# Patient Record
Sex: FEMALE | Race: WHITE | Employment: FULL TIME | URBAN - METROPOLITAN AREA
[De-identification: names, ages, dates, MRNs, and addresses within clinical notes are randomized per-mention and may not be internally consistent; named-entity substitution may affect disease eponyms.]

---

## 2017-01-02 NOTE — PROGRESS NOTES
SUBJECTIVE:                                                    Vero Michaels is a 41 year old female who presents to clinic today for the following health issues:      HPI    Hyperlipidemia Follow-Up      Rate your low fat/cholesterol diet?: fair    Taking statin?  Yes, no muscle aches from statin    Other lipid medications/supplements?:  Fenofibrate        Problem list and histories reviewed & adjusted, as indicated.  Additional history: as documented    Medication Followup of Phentermine     Taking Medication as prescribed: yes    Side Effects:  None    Medication Helping Symptoms:  yes       Patient Active Problem List   Diagnosis     Gestational diabetes mellitus, class A2     Depression     Former smoker     Impaired fasting glucose     Hypertriglyceridemia     Non morbid obesity     Type 2 diabetes mellitus without complication, without long-term current use of insulin (H)     Past Surgical History   Procedure Laterality Date     Cholecystectomy       Gyn surgery       c-sec       Social History   Substance Use Topics     Smoking status: Former Smoker -- 1.00 packs/day     Types: Cigarettes     Quit date: 08/20/2011     Smokeless tobacco: Never Used     Alcohol Use: No     Family History   Problem Relation Age of Onset     DIABETES Brother      DIABETES Maternal Grandfather      DIABETES Maternal Uncle      DIABETES Paternal Grandfather      DIABETES Father      C.A.D. Father      C.A.D. Paternal Grandfather          Current Outpatient Prescriptions   Medication Sig Dispense Refill     phentermine 37.5 MG capsule Take 1 capsule (37.5 mg) by mouth every morning 30 capsule 0     Fenofibrate Micronized 134 MG CAPS Take 1 capsule (134 mg) by mouth daily 90 capsule 3     atorvastatin (LIPITOR) 40 MG tablet Take 1 tablet (40 mg) by mouth daily 90 tablet 3     multivitamin, therapeutic with minerals (MULTI-VITAMIN) TABS Take 1 tablet by mouth daily       cyanocolbalamin (VITAMIN  B-12) 1000 MCG tablet Take  by mouth  "daily.       No Known Allergies  Recent Labs   Lab Test  01/05/17   0812  12/01/16   0756  12/10/15   0741 11/21/11   A1C   --   6.1*   --    --    LDL  45   --   28  28   HDL  30*   --   25*  25   TRIG  103   --   263*  424   ALT  23   --   24  21   CR  0.83   --   0.80  0.78   GFRESTIMATED  75   --   78   --    GFRESTBLACK  >90   GFR Calc     --   >90   GFR Calc     --    POTASSIUM  4.1   --   4.1  4.8   TSH  1.48   --   1.12  1.24      BP Readings from Last 3 Encounters:   01/06/17 110/74   12/09/16 116/76   12/08/15 112/70    Wt Readings from Last 3 Encounters:   01/06/17 214 lb (97.07 kg)   12/09/16 221 lb 12.8 oz (100.608 kg)   12/08/15 239 lb (108.41 kg)                  Labs reviewed in EPIC  Problem list, Medication list, Allergies, and Medical/Social/Surgical histories reviewed in [facility] and updated as appropriate.    ROS:  Constitutional, HEENT, cardiovascular, pulmonary, gi and gu systems are negative, except as otherwise noted.    OBJECTIVE:                                                    /74 mmHg  Pulse 90  Temp(Src) 97.7  F (36.5  C) (Temporal)  Resp 14  Ht 5' 4.96\" (1.65 m)  Wt 214 lb (97.07 kg)  BMI 35.65 kg/m2  LMP 01/02/2017  Body mass index is 35.65 kg/(m^2).  Physical Exam   Constitutional: She appears well-developed and well-nourished.   HENT:   Head: Normocephalic and atraumatic.   Cardiovascular: Normal rate and regular rhythm.    Pulmonary/Chest: Effort normal and breath sounds normal.   Psychiatric: She has a normal mood and affect.         Diagnostic Test Results:  none      ASSESSMENT/PLAN:                                                      Problem List Items Addressed This Visit     Hypertriglyceridemia     Improved triglycerides but the repeat cholesterol.  Continue fenofibrate and atorvastatin 40 mg daily.         Non morbid obesity     Lost close to 7 pounds in the last 6-7 weeks.  Denies any side effects from phentermine.  We'll " continue for 1 more month at an increased dose of 37.5 mg daily  Recheck in one month         Relevant Medications    phentermine 37.5 MG capsule      Other Visit Diagnoses     Screening for diabetic peripheral neuropathy         Relevant Orders     FOOT EXAM  NO CHARGE [81350.114] (Completed)              Helen Ng MD  [facility]

## 2017-01-05 DIAGNOSIS — E11.9 TYPE 2 DIABETES MELLITUS WITHOUT COMPLICATION, WITHOUT LONG-TERM CURRENT USE OF INSULIN (H): ICD-10-CM

## 2017-01-05 DIAGNOSIS — E66.9 NON MORBID OBESITY, UNSPECIFIED OBESITY TYPE: ICD-10-CM

## 2017-01-05 LAB
ALBUMIN SERPL-MCNC: 4.3 G/DL (ref 3.4–5)
ALP SERPL-CCNC: 39 U/L (ref 40–150)
ALT SERPL W P-5'-P-CCNC: 23 U/L (ref 0–50)
ANION GAP SERPL CALCULATED.3IONS-SCNC: 5 MMOL/L (ref 3–14)
AST SERPL W P-5'-P-CCNC: 12 U/L (ref 0–45)
BILIRUB SERPL-MCNC: 0.4 MG/DL (ref 0.2–1.3)
BUN SERPL-MCNC: 15 MG/DL (ref 7–30)
CALCIUM SERPL-MCNC: 8.7 MG/DL (ref 8.5–10.1)
CHLORIDE SERPL-SCNC: 108 MMOL/L (ref 94–109)
CHOLEST SERPL-MCNC: 96 MG/DL
CO2 SERPL-SCNC: 29 MMOL/L (ref 20–32)
CREAT SERPL-MCNC: 0.83 MG/DL (ref 0.52–1.04)
CREAT UR-MCNC: 92 MG/DL
GFR SERPL CREATININE-BSD FRML MDRD: 75 ML/MIN/1.7M2
GLUCOSE SERPL-MCNC: 119 MG/DL (ref 70–99)
HDLC SERPL-MCNC: 30 MG/DL
LDLC SERPL CALC-MCNC: 45 MG/DL
MICROALBUMIN UR-MCNC: 6 MG/L
MICROALBUMIN/CREAT UR: 6.88 MG/G CR (ref 0–25)
NONHDLC SERPL-MCNC: 66 MG/DL
POTASSIUM SERPL-SCNC: 4.1 MMOL/L (ref 3.4–5.3)
PROT SERPL-MCNC: 7.1 G/DL (ref 6.8–8.8)
SODIUM SERPL-SCNC: 142 MMOL/L (ref 133–144)
TRIGL SERPL-MCNC: 103 MG/DL
TSH SERPL DL<=0.005 MIU/L-ACNC: 1.48 MU/L (ref 0.4–4)

## 2017-01-05 PROCEDURE — 80053 COMPREHEN METABOLIC PANEL: CPT | Mod: 90 | Performed by: FAMILY MEDICINE

## 2017-01-05 PROCEDURE — 99000 SPECIMEN HANDLING OFFICE-LAB: CPT | Performed by: FAMILY MEDICINE

## 2017-01-05 PROCEDURE — 36415 COLL VENOUS BLD VENIPUNCTURE: CPT | Performed by: FAMILY MEDICINE

## 2017-01-05 PROCEDURE — 80061 LIPID PANEL: CPT | Mod: 90 | Performed by: FAMILY MEDICINE

## 2017-01-05 PROCEDURE — 84443 ASSAY THYROID STIM HORMONE: CPT | Mod: 90 | Performed by: FAMILY MEDICINE

## 2017-01-05 PROCEDURE — 82043 UR ALBUMIN QUANTITATIVE: CPT | Mod: 90 | Performed by: FAMILY MEDICINE

## 2017-01-06 ENCOUNTER — TELEPHONE (OUTPATIENT)
Dept: FAMILY MEDICINE | Facility: OTHER | Age: 42
End: 2017-01-06

## 2017-01-06 ENCOUNTER — OFFICE VISIT (OUTPATIENT)
Dept: FAMILY MEDICINE | Facility: OTHER | Age: 42
End: 2017-01-06
Payer: COMMERCIAL

## 2017-01-06 VITALS
WEIGHT: 214 LBS | TEMPERATURE: 97.7 F | BODY MASS INDEX: 35.65 KG/M2 | RESPIRATION RATE: 14 BRPM | HEIGHT: 65 IN | SYSTOLIC BLOOD PRESSURE: 110 MMHG | DIASTOLIC BLOOD PRESSURE: 74 MMHG | HEART RATE: 90 BPM

## 2017-01-06 DIAGNOSIS — Z13.89 SCREENING FOR DIABETIC PERIPHERAL NEUROPATHY: ICD-10-CM

## 2017-01-06 DIAGNOSIS — E66.9 NON MORBID OBESITY, UNSPECIFIED OBESITY TYPE: Primary | ICD-10-CM

## 2017-01-06 DIAGNOSIS — E78.1 HYPERTRIGLYCERIDEMIA: ICD-10-CM

## 2017-01-06 PROCEDURE — 99213 OFFICE O/P EST LOW 20 MIN: CPT | Performed by: FAMILY MEDICINE

## 2017-01-06 PROCEDURE — 99207 C FOOT EXAM  NO CHARGE: CPT | Mod: 25 | Performed by: FAMILY MEDICINE

## 2017-01-06 RX ORDER — PHENTERMINE HYDROCHLORIDE 37.5 MG/1
37.5 CAPSULE ORAL EVERY MORNING
Qty: 30 CAPSULE | Refills: 0 | Status: SHIPPED | OUTPATIENT
Start: 2017-01-06 | End: 2017-02-02

## 2017-01-06 ASSESSMENT — PAIN SCALES - GENERAL: PAINLEVEL: NO PAIN (0)

## 2017-01-06 NOTE — TELEPHONE ENCOUNTER
----- Message from Helen Ng MD sent at 1/5/2017  7:39 PM CST -----  Urine test is normal. Thyroid test is normal. Cholesterol levelshave significantly improved compared to last time.Blood chemistries including kidneyAnd liver functions are normal

## 2017-01-06 NOTE — MR AVS SNAPSHOT
"              After Visit Summary   1/6/2017    Vero Michaels    MRN: [id number]           Patient Information     Date Of Birth          1975        Visit Information        Provider Department      1/6/2017 9:40 AM Helen Ng MD [facility]        Today's Diagnoses     Screening for malignant neoplasm of cervix    -  1     Screening for diabetic peripheral neuropathy         Need for prophylactic vaccination against Streptococcus pneumoniae (pneumococcus)         Need for prophylactic vaccination with tetanus-diphtheria (TD)         Type 2 diabetes mellitus without complication, without long-term current use of insulin (H)            Follow-ups after your visit        Follow-up notes from your care team     Return in about 1 month (around 2/6/2017).      Who to contact     If you have questions or need follow up information about today's clinic visit or your schedule please contact [facility] directly at [phone].  Normal or non-critical lab and imaging results will be communicated to you by MyChart, letter or phone within 4 business days after the clinic has received the results. If you do not hear from us within 7 days, please contact the clinic through MyChart or phone. If you have a critical or abnormal lab result, we will notify you by phone as soon as possible.  Submit refill requests through [employer] or call your pharmacy and they will forward the refill request to us. Please allow 3 business days for your refill to be completed.          Additional Information About Your Visit        MyChart Information     [employer]My[employer] lets you send messages to your doctor, view your test results, renew your prescriptions, schedule appointments and more. To sign up, go to [email or web address] . Click on \"Log in\" on the left side of the screen, which will take you to the Welcome page. Then click on \"Sign up Now\" on the right side of the page.     You will be asked to enter the " "access code listed below, as well as some personal information. Please follow the directions to create your username and password.     Your access code is: [email or web address]  Expires: 3/9/2017 10:42 AM     Your access code will  in 90 days. If you need help or a new code, please call your [place] clinic or [phone].        Care EveryWhere ID     This is your Care EveryWhere ID. This could be used by other organizations to access your [place] medical records  [id number]        Your Vitals Were     Pulse Temperature Respirations Height BMI (Body Mass Index) Last Period     97.7  F (36.5  C) (Temporal) 14 5' 4.96\" (1.65 m) 35.65 kg/m2 2017       Blood Pressure from Last 3 Encounters:   17 110/74   16 116/76   12/08/15 112/70    Weight from Last 3 Encounters:   17 214 lb (97.07 kg)   16 221 lb 12.8 oz (100.608 kg)   12/08/15 239 lb (108.41 kg)              We Performed the Following     FOOT EXAM  NO CHARGE [08891.114]          Today's Medication Changes          These changes are accurate as of: 17 10:09 AM.  If you have any questions, ask your nurse or doctor.               These medicines have changed or have updated prescriptions.        Dose/Directions    phentermine 37.5 MG capsule   This may have changed:    - medication strength  - how much to take  - how to take this  - when to take this  - additional instructions   Used for:  Type 2 diabetes mellitus without complication, without long-term current use of insulin (H)   Changed by:  Helen Ng MD        Dose:  37.5 mg   Take 1 capsule (37.5 mg) by mouth every morning   Quantity:  30 capsule   Refills:  0            Where to get your medicines      Some of these will need a paper prescription and others can be bought over the counter.  Ask your nurse if you have questions.     Bring a paper prescription for each of these medications    - phentermine 37.5 MG capsule             Primary Care Provider Office Phone # " Fax #    Helen Ng [phone]       [facility] [address]    [facility] [address]        Thank you!     Thank you for choosing [facility]  for your care. Our goal is always to provide you with excellent care. Hearing back from our patients is one way we can continue to improve our services. Please take a few minutes to complete the written survey that you may receive in the mail after your visit with us. Thank you!             Your Updated Medication List - Protect others around you: Learn how to safely use, store and throw away your medicines at www.disposemymeds.org.          This list is accurate as of: 1/6/17 10:09 AM.  Always use your most recent med list.                   Brand Name Dispense Instructions for use    atorvastatin 40 MG tablet    LIPITOR    90 tablet    Take 1 tablet (40 mg) by mouth daily       cyanocobalamin 1000 MCG tablet    vitamin  B-12     Take  by mouth daily.       Fenofibrate Micronized 134 MG Caps     90 capsule    Take 1 capsule (134 mg) by mouth daily       Multi-vitamin Tabs tablet      Take 1 tablet by mouth daily       phentermine 37.5 MG capsule     30 capsule    Take 1 capsule (37.5 mg) by mouth every morning

## 2017-01-06 NOTE — ASSESSMENT & PLAN NOTE
Improved triglycerides but the repeat cholesterol.  Continue fenofibrate and atorvastatin 40 mg daily.

## 2017-01-06 NOTE — ASSESSMENT & PLAN NOTE
Lost close to 7 pounds in the last 6-7 weeks.  Denies any side effects from phentermine.  We'll continue for 1 more month at an increased dose of 37.5 mg daily  Recheck in one month

## 2017-01-06 NOTE — TELEPHONE ENCOUNTER
LM for pt to return our please give message from below. Sada Alexandra CMA ([facility])
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
## 2017-02-02 ENCOUNTER — OFFICE VISIT (OUTPATIENT)
Dept: FAMILY MEDICINE | Facility: OTHER | Age: 42
End: 2017-02-02
Payer: COMMERCIAL

## 2017-02-02 VITALS
SYSTOLIC BLOOD PRESSURE: 126 MMHG | BODY MASS INDEX: 34.99 KG/M2 | WEIGHT: 210 LBS | HEART RATE: 70 BPM | RESPIRATION RATE: 14 BRPM | TEMPERATURE: 98.2 F | HEIGHT: 65 IN | DIASTOLIC BLOOD PRESSURE: 80 MMHG

## 2017-02-02 DIAGNOSIS — Z23 NEED FOR PNEUMOCOCCAL VACCINATION: ICD-10-CM

## 2017-02-02 DIAGNOSIS — Z00.00 ENCOUNTER FOR ROUTINE ADULT HEALTH EXAMINATION WITHOUT ABNORMAL FINDINGS: Primary | ICD-10-CM

## 2017-02-02 DIAGNOSIS — Z12.4 SCREENING FOR CERVICAL CANCER: ICD-10-CM

## 2017-02-02 DIAGNOSIS — E11.9 TYPE 2 DIABETES MELLITUS WITHOUT COMPLICATION, WITHOUT LONG-TERM CURRENT USE OF INSULIN (H): ICD-10-CM

## 2017-02-02 DIAGNOSIS — E66.9 NON MORBID OBESITY, UNSPECIFIED OBESITY TYPE: ICD-10-CM

## 2017-02-02 DIAGNOSIS — Z12.4 SCREENING FOR MALIGNANT NEOPLASM OF CERVIX: ICD-10-CM

## 2017-02-02 PROCEDURE — 90471 IMMUNIZATION ADMIN: CPT | Performed by: FAMILY MEDICINE

## 2017-02-02 PROCEDURE — 90732 PPSV23 VACC 2 YRS+ SUBQ/IM: CPT | Performed by: FAMILY MEDICINE

## 2017-02-02 PROCEDURE — 99396 PREV VISIT EST AGE 40-64: CPT | Mod: 25 | Performed by: FAMILY MEDICINE

## 2017-02-02 PROCEDURE — 87624 HPV HI-RISK TYP POOLED RSLT: CPT | Performed by: FAMILY MEDICINE

## 2017-02-02 PROCEDURE — G0145 SCR C/V CYTO,THINLAYER,RESCR: HCPCS | Performed by: FAMILY MEDICINE

## 2017-02-02 RX ORDER — PHENTERMINE HYDROCHLORIDE 37.5 MG/1
37.5 CAPSULE ORAL EVERY MORNING
Qty: 30 CAPSULE | Refills: 0 | Status: SHIPPED | OUTPATIENT
Start: 2017-03-06 | End: 2017-04-06

## 2017-02-02 RX ORDER — PHENTERMINE HYDROCHLORIDE 37.5 MG/1
37.5 CAPSULE ORAL EVERY MORNING
Qty: 30 CAPSULE | Refills: 0 | Status: SHIPPED | OUTPATIENT
Start: 2017-02-03 | End: 2017-02-02

## 2017-02-02 ASSESSMENT — PAIN SCALES - GENERAL: PAINLEVEL: NO PAIN (0)

## 2017-02-02 NOTE — Clinical Note
[address]  [facility] [address]  [phone]      February 9, 2017    Vero Page  [address] [facility] [address]    Dear Vero,  We are happy to inform you that your PAP smear result from 2/2/17 is normal.  We are now able to do a follow up test on PAP smears. The DNA test is for HPV (Human Papilloma Virus). Cervical cancer is closely linked with certain types of HPV. Your result showed no evidence of high risk HPV.  Therefore we recommend you return in 3 years for your next pap smear.  You will still need to return to the clinic every year for an annual exam and other preventive tests.  Please contact the clinic with any questions.  Sincerely,  Helen Ng MD/dimitri

## 2017-02-02 NOTE — NURSING NOTE
"Chief Complaint   Patient presents with     Physical       Initial /80 mmHg  Pulse 70  Temp(Src) 98.2  F (36.8  C) (Temporal)  Resp 14  Ht 5' 5.12\" (1.654 m)  Wt 210 lb (95.255 kg)  BMI 34.82 kg/m2  LMP 01/02/2017 Estimated body mass index is 34.82 kg/(m^2) as calculated from the following:    Height as of this encounter: 5' 5.12\" (1.654 m).    Weight as of this encounter: 210 lb (95.255 kg).  BP completed using cuff size: regular/long  Mariama Peng CMA    "

## 2017-02-02 NOTE — MR AVS SNAPSHOT
After Visit Summary   2/2/2017    Vero Michaels    MRN: [id number]           Patient Information     Date Of Birth          1975        Visit Information        Provider Department      2/2/2017 8:00 AM Helen Ng MD [facility]        Today's Diagnoses     Screening for malignant neoplasm of cervix    -  1     Non morbid obesity, unspecified obesity type         Type 2 diabetes mellitus without complication, without long-term current use of insulin (H)         Screening for cervical cancer           Care Instructions      Preventive Health Recommendations  Female Ages 40 to 49    Yearly exam:     See your health care provider every year in order to  1. Review health changes.   2. Discuss preventive care.    3. Review your medicines if your doctor prescribed any.      Get a Pap test every three years (unless you have an abnormal result and your provider advises testing more often).      If you get Pap tests with HPV test, you only need to test every 5 years, unless you have an abnormal result. You do not need a Pap test if your uterus was removed (hysterectomy) and you have not had cancer.      You should be tested each year for STDs (sexually transmitted diseases), if you're at risk.       Ask your doctor if you should have a mammogram.      Have a colonoscopy (test for colon cancer) if someone in your family has had colon cancer or polyps before age 50.       Have a cholesterol test every 5 years.       Have a diabetes test (fasting glucose) after age 45. If you are at risk for diabetes, you should have this test every 3 years.    Shots: Get a flu shot each year. Get a tetanus shot every 10 years.     Nutrition:     Eat at least 5 servings of fruits and vegetables each day.    Eat whole-grain bread, whole-wheat pasta and brown rice instead of white grains and rice.    Talk to your provider about Calcium and Vitamin D.     Lifestyle    Exercise at least 150 minutes a week (an  average of 30 minutes a day, 5 days a week). This will help you control your weight and prevent disease.    Limit alcohol to one drink per day.    No smoking.     Wear sunscreen to prevent skin cancer.    See your dentist every six months for an exam and cleaning.        Follow-ups after your visit        Additional Services     DIABETES EDUCATOR REFERRAL       Your provider has referred you to Diabetes Education: FM[facility]: Diabetes Education - All [facility] [phone]   [email or web address]    This is a New Diagnosis: Initial group DSMT - 10 hours.    Type of diabetes is Type 2 - Diet Control                                                          A1C is: A1C      6.1   12/1/2016  If an urgent visit is needed or A1C is above 12, Care Team to call the diabetes education team at [phone] or send a message to the diabetes education pool (P DIAB ED-PATIENT CARE).    Diabetes education focus: Comprehensive Knowledge Assessment and Instruction, Knowledge: Healthy Eating, Being Active, Monitoring Blood Sugar, Taking Medication, Problem Solving/Goal Setting, Reducing Risks (Preventing Acute and Chronic Diabetes Complications) and Healthy Coping and Blood glucose meter instruction       Education needs: None                                                                                                                                                      Please be aware that coverage of these services is subject to the terms and limitations of your health insurance plan.  Call member services at your health plan to determine Diabetes Self-Management Training benefits and ask which blood glucose monitor brands are covered by your plan.      Please bring the following to your appointment:    -   List of current medications   -   List of blood glucose monitor brands that are covered by your insurance plan  -   Blood glucose monitor and log book  -   Food records for the 3  "days prior to your visit                  Follow-up notes from your care team     Return in about 2 months (around 2017).      Who to contact     If you have questions or need follow up information about today's clinic visit or your schedule please contact [facility] [place] directly at [phone].  Normal or non-critical lab and imaging results will be communicated to you by MyChart, letter or phone within 4 business days after the clinic has received the results. If you do not hear from us within 7 days, please contact the clinic through MyChart or phone. If you have a critical or abnormal lab result, we will notify you by phone as soon as possible.  Submit refill requests through [employer] or call your pharmacy and they will forward the refill request to us. Please allow 3 business days for your refill to be completed.          Additional Information About Your Visit        MyChart Information     [employer] lets you send messages to your doctor, view your test results, renew your prescriptions, schedule appointments and more. To sign up, go to [email or web address] . Click on \"Log in\" on the left side of the screen, which will take you to the Welcome page. Then click on \"Sign up Now\" on the right side of the page.     You will be asked to enter the access code listed below, as well as some personal information. Please follow the directions to create your username and password.     Your access code is: [email or web address]  Expires: 3/9/2017 10:42 AM     Your access code will  in 90 days. If you need help or a new code, please call your [facility] or [phone].        Care EveryWhere ID     This is your Care EveryWhere ID. This could be used by other organizations to access your [place] medical records  [id number]        Your Vitals Were     Pulse Temperature Respirations Height BMI (Body Mass Index) Last Period    70 98.2  F (36.8  C) (Temporal) 14 5' 5.12\" (1.654 m) 34.82 kg/m2 2017    "    Blood Pressure from Last 3 Encounters:   02/02/17 126/80   01/06/17 110/74   12/09/16 116/76    Weight from Last 3 Encounters:   02/02/17 210 lb (95.255 kg)   01/06/17 214 lb (97.07 kg)   12/09/16 221 lb 12.8 oz (100.608 kg)              We Performed the Following     DIABETES EDUCATOR REFERRAL     HPV High Risk Types DNA Cervical     Pap imaged thin layer screen with HPV - recommended age 30 - 65 years (select HPV order below)          Today's Medication Changes          These changes are accurate as of: 2/2/17  8:47 AM.  If you have any questions, ask your nurse or doctor.               Start taking these medicines.        Dose/Directions    phentermine 37.5 MG capsule   Used for:  Non morbid obesity, unspecified obesity type   Started by:  Helen Ng MD        Dose:  37.5 mg   Start taking on:  3/6/2017   Take 1 capsule (37.5 mg) by mouth every morning   Quantity:  30 capsule   Refills:  0            Where to get your medicines      Some of these will need a paper prescription and others can be bought over the counter.  Ask your nurse if you have questions.     Bring a paper prescription for each of these medications    - phentermine 37.5 MG capsule             Primary Care Provider Office Phone # Fax #    Helen Ng [phone]       [facility] [address]    [facility] [address]        Thank you!     Thank you for choosing [facility]  for your care. Our goal is always to provide you with excellent care. Hearing back from our patients is one way we can continue to improve our services. Please take a few minutes to complete the written survey that you may receive in the mail after your visit with us. Thank you!             Your Updated Medication List - Protect others around you: Learn how to safely use, store and throw away your medicines at www.disposemymeds.org.          This list is accurate as of: 2/2/17  8:47 AM.  Always use your most  recent med list.                   Brand Name Dispense Instructions for use    atorvastatin 40 MG tablet    LIPITOR    90 tablet    Take 1 tablet (40 mg) by mouth daily       cyanocobalamin 1000 MCG tablet    vitamin  B-12     Take  by mouth daily.       Fenofibrate Micronized 134 MG Caps     90 capsule    Take 1 capsule (134 mg) by mouth daily       Multi-vitamin Tabs tablet      Take 1 tablet by mouth daily       phentermine 37.5 MG capsule   Start taking on:  3/6/2017     30 capsule    Take 1 capsule (37.5 mg) by mouth every morning

## 2017-02-06 LAB
COPATH REPORT: NORMAL
PAP: NORMAL

## 2017-02-08 LAB
FINAL DIAGNOSIS: NORMAL
HPV HR 12 DNA CVX QL NAA+PROBE: NEGATIVE
HPV16 DNA SPEC QL NAA+PROBE: NEGATIVE
HPV18 DNA SPEC QL NAA+PROBE: NEGATIVE
SPECIMEN DESCRIPTION: NORMAL

## 2017-02-21 ENCOUNTER — TELEPHONE (OUTPATIENT)
Dept: FAMILY MEDICINE | Facility: OTHER | Age: 42
End: 2017-02-21

## 2017-02-22 ENCOUNTER — ALLIED HEALTH/NURSE VISIT (OUTPATIENT)
Dept: EDUCATION SERVICES | Facility: OTHER | Age: 42
End: 2017-02-22
Payer: COMMERCIAL

## 2017-02-22 VITALS — WEIGHT: 208.5 LBS | HEIGHT: 65 IN | BODY MASS INDEX: 34.74 KG/M2

## 2017-02-22 DIAGNOSIS — E11.9 TYPE 2 DIABETES MELLITUS WITHOUT COMPLICATION, WITHOUT LONG-TERM CURRENT USE OF INSULIN (H): Primary | ICD-10-CM

## 2017-02-22 PROCEDURE — G0108 DIAB MANAGE TRN  PER INDIV: HCPCS

## 2017-02-22 RX ORDER — LANCETS
EACH MISCELLANEOUS
Qty: 1 BOX | Status: SHIPPED | OUTPATIENT
Start: 2017-02-22 | End: 2017-09-22

## 2017-02-22 NOTE — MR AVS SNAPSHOT
After Visit Summary   2/22/2017    Vero Michaels    MRN: [id number]           Patient Information     Date Of Birth          1975        Visit Information        Provider Department      2/22/2017 10:30 AM ER DIABETIC ED RESOURCE [facility]        Today's Diagnoses     Type 2 diabetes mellitus without complication, without long-term current use of insulin (H)    -  1      Care Instructions    1.  Test blood sugar 2 times a day, before breakfast and 2 hours after 1 meal (vary the meal).  For 2-3 days, test blood sugar immediately after waking and again before breakfast to see how it responds to the long fast.    2.  Aim for a minimum of 30 grams of carb at meals and 15 grams of carb at snacks.    3.  Continue to remain active.    4.  Choose whole grains.    FOLLOW UP:  March 15th @ 11:30 am    [place] [facility] Education and Nutrition Services for the [facility]:  For Your Diabetes Education and Nutrition Appointments Call:  [phone]   For Diabetes Education or Nutrition Related Questions:   Phone: [phone]  E-mail: [email or web address]  Fax: [phone]   If you need a medication refill please contact your pharmacy. Please allow 3 business days for your refills to be completed.    Instructions for emailing the Diabetes Educators    If you need to communicate a non-urgent message to a Diabetes Educator via email, please send to [email or web address].    Please follow the following email guidelines:    Subject line: Secure: your clinic name (example: Secure: [facility])  In the email please include: First name, middle initial, last name and date of birth.    We will be in touch with you within one (1) business day.\        Follow-ups after your visit        Your next 10 appointments already scheduled     Mar 15, 2017 11:30 AM CDT   Diabetic Education with ER DIABETIC ED RESOURCE   [facility]    [address]  [place]  "[place] [id number] [phone]              Who to contact     If you have questions or need follow up information about today's clinic visit or your schedule please contact [facility] [place] directly at [phone].  Normal or non-critical lab and imaging results will be communicated to you by MyChart, letter or phone within 4 business days after the clinic has received the results. If you do not hear from us within 7 days, please contact the clinic through MyChart or phone. If you have a critical or abnormal lab result, we will notify you by phone as soon as possible.  Submit refill requests through [employer] or call your pharmacy and they will forward the refill request to us. Please allow 3 business days for your refill to be completed.          Additional Information About Your Visit        [employer] Information     [employer] lets you send messages to your doctor, view your test results, renew your prescriptions, schedule appointments and more. To sign up, go to [email or web address] . Click on \"Log in\" on the left side of the screen, which will take you to the Welcome page. Then click on \"Sign up Now\" on the right side of the page.     You will be asked to enter the access code listed below, as well as some personal information. Please follow the directions to create your username and password.     Your access code is: [email or web address]  Expires: 3/9/2017 10:42 AM     Your access code will  in 90 days. If you need help or a new code, please call your [facility] clinic or [phone].        Care EveryWhere ID     This is your Care EveryWhere ID. This could be used by other organizations to access your [place] medical records  [id number]        Your Vitals Were     Height BMI (Body Mass Index)                1.645 m (5' 4.75\") 34.96 kg/m2           Blood Pressure from Last 3 Encounters:   17 126/80   17 110/74   16 116/76    Weight from Last 3 Encounters:   17 94.6 kg (208 lb 8 " oz)   02/02/17 95.3 kg (210 lb)   01/06/17 97.1 kg (214 lb)              Today, you had the following     No orders found for display         Today's Medication Changes          These changes are accurate as of: 2/22/17 11:24 AM.  If you have any questions, ask your nurse or doctor.               Start taking these medicines.        Dose/Directions    blood glucose monitoring lancets   Used for:  Type 2 diabetes mellitus without complication, without long-term current use of insulin (H)        Use to test blood sugar 2 times daily or as directed.  Ok to substitute alternative if insurance prefers.   Quantity:  1 Box   Refills:  prn       blood glucose monitoring test strip   Commonly known as:  ACCU-CHEK SMARTVIEW   Used for:  Type 2 diabetes mellitus without complication, without long-term current use of insulin (H)        Use to test blood sugar 2 times daily or as directed.  Ok to substitute alternative if insurance prefers.   Quantity:  100 strip   Refills:  3            Where to get your medicines      These medications were sent to [facility] [address] [place] [address] [address] [address]     Phone:  [phone]     blood glucose monitoring lancets    blood glucose monitoring test strip                Primary Care Provider Office Phone # Fax #    Helen Ng [phone]       [address] [address]        Thank you!     Thank you for choosing [facility]  for your care. Our goal is always to provide you with excellent care. Hearing back from our patients is one way we can continue to improve our services. Please take a few minutes to complete the written survey that you may receive in the mail after your visit with us. Thank you!             Your Updated Medication List - Protect others around you: Learn how to safely use, store and throw away your medicines at www.disposemymeds.org.           This list is accurate as of: 2/22/17 11:24 AM.  Always use your most recent med list.                   Brand Name Dispense Instructions for use    atorvastatin 40 MG tablet    LIPITOR    90 tablet    Take 1 tablet (40 mg) by mouth daily       blood glucose monitoring lancets     1 Box    Use to test blood sugar 2 times daily or as directed.  Ok to substitute alternative if insurance prefers.       blood glucose monitoring test strip    ACCU-CHEK SMARTVIEW    100 strip    Use to test blood sugar 2 times daily or as directed.  Ok to substitute alternative if insurance prefers.       cyanocobalamin 1000 MCG tablet    vitamin  B-12     Take  by mouth daily.       Fenofibrate Micronized 134 MG Caps     90 capsule    Take 1 capsule (134 mg) by mouth daily       Multi-vitamin Tabs tablet      Take 1 tablet by mouth daily       phentermine 37.5 MG capsule   Start taking on:  3/6/2017     30 capsule    Take 1 capsule (37.5 mg) by mouth every morning

## 2017-02-22 NOTE — Clinical Note
[place],   I met with Vero Michaels for a diabetes education visit today. Ongoing plan for education and support: Follow-up visit with diabetes educator in 3 weeks. If there are any questions or recommended changes to the patient's diabetes care plan in the future, I will be in touch. Thank you for your referral!  Radha Peng RD, LD, CDE   (This communication is being sent to you in order to comply with our American Association of Diabetes Educators accreditation standards.)

## 2017-02-22 NOTE — PROGRESS NOTES
Diabetes Self Management Training: Initial Assessment Visit for Newly Diagnosed Patients (Complete AADE Goals Flowsheet)    Vero Michaels presents today for education related to Type 2 diabetes.    She is accompanied by self    Patient's diabetes management related comments/concerns: Hx of GDM and strong family history of Type 2 Diabetes.  Has been working really hard at weight loss.    Patient's emotional response to diabetes: expresses readiness to learn, anxiety and concern for health and well-being    Patient would like this visit to be focused around the following diabetes-related behaviors and goals: Healthy Eating and Monitoring    ASSESSMENT:  Patient Problem List and Family Medical History reviewed for relevant medical history, current medical status, and diabetes risk factors.    Current Diabetes Management per Patient:  Taking diabetes medications? no    Past Diabetes Education: Newly diagnosed    Patient glucose self monitoring as follows: BG meter taught today.     Patient's most recent   Lab Results   Component Value Date    A1C 6.1 12/01/2016    is meeting goal of <7.0    Nutrition:  Patient is on phentermine.  Finds she thought about food 24/7 prior to taking this medication.  Tries to keep busy.  Lives with mother who is disabled and has diabetes    Wakes @ 4:45 am.  Doesn't feel well so does not like to eat.  Breakfast - none  Lunch - 11:30:  Homemade taco salad OR ashley salad.  Diet Green tea  PM:  fruit   Dinner - (5 - 5:30 pm):  Vegetable / salad (cauliflower with parmesean) + burger.  Snacks - Bowl of special K cereal w/ milk (doesn't drink the milk) OR popcorn OR cheese.    Beverages: Tea  1/2 - 1/day and Water throught the day    Cultural/Roman [employer] diet restrictions: Avoids starches at dinner as it triggers over-eating.    Biggest Challenge to Healthy Eating: emotional eating    HBW: 240 lbs.  Wt is down 32 lbs over the past 6 months.    Physical Activity:    Does better with 'projects' versus  "exercising.  Cleaning, painting, etc.  Has rode the exercise bike, but dislikes it.  Fridays - cleans house thoroughly.  Active w/ 9 y/o son.    Diabetes Risk Factors:  family history, hypertriglyceridemia and overweight/obesity    Diabetes Complications:  Not discussed today.    Vitals:  Wt 94.6 kg (208 lb 8 oz)  BMI 34.57 kg/m2  Estimated body mass index is 34.57 kg/(m^2) as calculated from the following:    Height as of 2/2/17: 1.654 m (5' 5.12\").    Weight as of this encounter: 94.6 kg (208 lb 8 oz).   Last 3 BP:   BP Readings from Last 3 Encounters:   02/02/17 126/80   01/06/17 110/74   12/09/16 116/76       History   Smoking Status     Former Smoker     Packs/day: 1.00     Types: Cigarettes     Quit date: 8/20/2011   Smokeless Tobacco     Never Used       Labs:  Lab Results   Component Value Date    A1C 6.1 12/01/2016     Lab Results   Component Value Date     01/05/2017     Lab Results   Component Value Date    LDL 45 01/05/2017     HDL Cholesterol   Date Value Ref Range Status   01/05/2017 30 (L) >49 mg/dL Final   ]  GFR Estimate   Date Value Ref Range Status   01/05/2017 75 >60 mL/min/1.7m2 Final     Comment:     Non  GFR Calc     GFR Estimate If Black   Date Value Ref Range Status   01/05/2017 >90   GFR Calc   >60 mL/min/1.7m2 Final     Lab Results   Component Value Date    CR 0.83 01/05/2017     No results found for: MICROALBUMIN    Socio/Economic Considerations:    Support system: family    Health Beliefs and Attitudes:   Patient Activation Measure Survey Score:  No flowsheet data found.    Stage of Change: ACTION (Actively working towards change)      Diabetes knowledge and skills assessment:     Patient is knowledgeable in diabetes management concepts related to: Being Active    Patient needs further education on the following diabetes management concepts: Healthy Eating, Monitoring, Problem Solving, Reducing Risks and Healthy Coping    Barriers to Learning " Assessment: No Barriers identified    Based on learning assessment above, most appropriate setting for further diabetes education would be: Group class or Individual setting.    INTERVENTION:   Education provided today on:  AADE Self-Care Behaviors:  Healthy Eating: carbohydrate counting and consistency in amount, composition, and timing of food intake  Monitoring: purpose, proper technique, log and interpret results, individual blood glucose targets, frequency of monitoring, use of glucose control solution and proper sharps disposal  Patient was instructed on Accu-chek Dorinda meter.  She has used a blood glucose meter in the past when she had GDM and declined doing a self test today.  Opportunities for ongoing education and support in diabetes-self management were discussed.    Pt verbalized understanding of concepts discussed and recommendations provided today.       Education Materials Provided:  [place] Understanding Diabetes Booklet, BG Log Sheet and Accu-chek Dorinda meter kit    PLAN:  See Patient Instructions for co-developed, patient-stated behavior change goals.  Meal Plan Recommendation: If BG rises in the morning when you don't eat breakfast, begin to eat breakfast daily.  Aim for a minimum of 30 grams of carb at meals.  Choose whole grains.  Check blood sugars fasting and 2 hours after the start of meals ( vary the meal)  Keep a food record for the next visit.  AVS printed and provided to patient today.    FOLLOW-UP:  Follow-up appointment scheduled on 3/15/17.  Education topics to cover at the next diabetes education visit(s): reducing risks.  Assess meal plan.  Chart routed to referring provider.    Ongoing plan for education and support: Follow-up visit with diabetes educator in 3 weeks.    Radha Peng RD, LD, CDE    Time Spent: 60 minutes  Encounter Type: Individual

## 2017-02-22 NOTE — PATIENT INSTRUCTIONS
1.  Test blood sugar 2 times a day, before breakfast and 2 hours after 1 meal (vary the meal).  For 2-3 days, test blood sugar immediately after waking and again before breakfast to see how it responds to the long fast.    2.  Aim for a minimum of 30 grams of carb at meals and 15 grams of carb at snacks.    3.  Continue to remain active.    4.  Choose whole grains.    FOLLOW UP:  March 15th @ 11:30 am    [place] Diabetes Education and Nutrition Services for the [facility]:  For Your Diabetes Education and Nutrition Appointments Call:  [phone]   For Diabetes Education or Nutrition Related Questions:   Phone: [phone]  E-mail: [email or web address]  Fax: [phone]   If you need a medication refill please contact your pharmacy. Please allow 3 business days for your refills to be completed.    Instructions for emailing the Diabetes Educators    If you need to communicate a non-urgent message to a Diabetes Educator via email, please send to [email or web address].    Please follow the following email guidelines:    Subject line: Secure: your clinic name (example: Secure: [facility])  In the email please include: First name, middle initial, last name and date of birth.    We will be in touch with you within one (1) business day.\

## 2017-03-15 ENCOUNTER — ALLIED HEALTH/NURSE VISIT (OUTPATIENT)
Dept: EDUCATION SERVICES | Facility: OTHER | Age: 42
End: 2017-03-15
Payer: COMMERCIAL

## 2017-03-15 VITALS — HEIGHT: 65 IN | BODY MASS INDEX: 33.49 KG/M2 | WEIGHT: 201 LBS

## 2017-03-15 DIAGNOSIS — E11.9 TYPE 2 DIABETES MELLITUS WITHOUT COMPLICATION, WITHOUT LONG-TERM CURRENT USE OF INSULIN (H): Primary | ICD-10-CM

## 2017-03-15 PROCEDURE — G0108 DIAB MANAGE TRN  PER INDIV: HCPCS

## 2017-03-15 NOTE — PROGRESS NOTES
Diabetes Self Management Training: Follow-up Visit    Vero Michaels presents today for education and evaluation of glucose control related to Type 2 diabetes.    She is accompanied by self    Patient's diabetes management related comments/concerns: Doing well.   recently diagnosed with diabetes with an A1c of 13%.  Working on diet changes together.      Patient would like this visit to be focused around the following diabetes-related behaviors and goals: Assistance with making lifestyle changes    ASSESSMENT:  Patient Problem List reviewed for relevant medical history and current medical status.    Current Diabetes Management per Patient:  Taking diabetes medications? no    Patient glucose self monitoring as follows: two times daily.     BG results: fasting glucose- 120 (today), 106, 118, 122, 113, 128, 101, 113, 109, 109, 127 and post-supper glucose- 106, 83, 120, 100, 121, 90, 108, 118, 169, 87     BG values are: In goal  Patient's most recent   Lab Results   Component Value Date    A1C 6.1 12/01/2016    is meeting goal of <7.0    Nutrition:  Patient has started eating breakfast daily.  Continues to be careful of carb intake, usually under 30 grams of carb/meal.  Continues to take phentermine.  Weight is down 7 lbs in 3 weeks and 39 lbs from [facility]W.  Lives with mom who has diabetes and is supportive.  Tries to mimic who mother's portions.    Breakfast - yogurt (has started this since last visit)  Lunch - sandwich OR chicken on a salad.     Dinner - meat and double portion of veggies.   Snacks - bowl of cold cereal, popcorn, cheese    Beverages: Water all day    Cultural/Christian diet restrictions: No     Biggest Challenge to Healthy Eating: none    Physical Activity:    Type: stays busy with cleaning and rearranging in her own home.      Diabetes Complications:  Chronic Complication Prevention: Eyes: exam within in the last year? No  Nerve/Circulation: foot exam within the last year Yes  Heart Health: BP to goal  "Yes, LDL to goal Yes, Daily Aspirin No  Dental Health: brushing/flossing regularly Yes, dental exam within last year No  Immunizations (flu/pneumonia) up to date? Yes    Vitals:  Wt 91.2 kg (201 lb)  BMI 33.71 kg/m2  Estimated body mass index is 33.71 kg/(m^2) as calculated from the following:    Height as of 2/22/17: 1.645 m (5' 4.75\").    Weight as of this encounter: 91.2 kg (201 lb).   Last 3 BP:   BP Readings from Last 3 Encounters:   02/02/17 126/80   01/06/17 110/74   12/09/16 116/76       History   Smoking Status     Former Smoker     Packs/day: 1.00     Types: Cigarettes     Quit date: 8/20/2011   Smokeless Tobacco     Never Used       Labs:  Lab Results   Component Value Date    A1C 6.1 12/01/2016     Lab Results   Component Value Date     01/05/2017     Lab Results   Component Value Date    LDL 45 01/05/2017     HDL Cholesterol   Date Value Ref Range Status   01/05/2017 30 (L) >49 mg/dL Final   ]  GFR Estimate   Date Value Ref Range Status   01/05/2017 75 >60 mL/min/1.7m2 Final     Comment:     Non  GFR Calc     GFR Estimate If Black   Date Value Ref Range Status   01/05/2017 >90   GFR Calc   >60 mL/min/1.7m2 Final     Lab Results   Component Value Date    CR 0.83 01/05/2017     No results found for: MICROALBUMIN    Health Beliefs and Attitudes:   Patient Activation Measure Survey Score:  No flowsheet data found.    Stage of Change: ACTION (Actively working towards change)    Progress toward meeting diabetes-related behavioral goals:    GOALS % Met Goal   Healthy Eating 100 (30 g carb/meal; 15 g carb/snack)   Physical Activity 100 (remain active doing chores)   Monitoring 100 (2x/day)   Medication Taking     Problem Solving     Healthy Coping     Risk Reduction           Diabetes knowledge and skills assessment:     Patient is knowledgeable in diabetes management concepts related to: Healthy Eating, Being Active and Monitoring    Patient needs further education on the " following diabetes management concepts: Problem Solving, Reducing Risks and Healthy Coping      INTERVENTION:    Education provided today on:  AADE Self-Care Behaviors:  Problem Solving: high blood glucose - causes, signs/symptoms, treatment and prevention  Reducing Risks: major complications of diabetes, prevention, early diagnostic measures and treatment of complications, foot care, appropriate dental care, annual eye exam, A1C - goals, relating to blood glucose levels, how often to check, lipids levels and goals and blood pressure and goals    Opportunities for ongoing education and support in diabetes-self management were discussed.    Pt verbalized understanding of concepts discussed and recommendations provided today.       Education Materials Provided:  [facility] Taking Charge of Your Diabetes Book    PLAN:  See Patient Instructions for co-developed, patient-stated behavior change goals.  AVS printed and provided to patient today.    FOLLOW-UP:  Follow-up appointment scheduled on 5/17.  Education topics to cover at the next diabetes education visit(s): healthy coping  Follow-up with PCP recommended.    SPENCER Baer RD, [place]      Ongoing plan for education and support: Follow-up visit with diabetes educator in 2 months    SPENCER Baer RD, [place]    Time Spent: 60 minutes  Encounter Type: Individual

## 2017-03-15 NOTE — PATIENT INSTRUCTIONS
1.  Schedule eye appointment  2.  Continue weight loss efforts with eating and activity  3.  Continue to test blood sugar 2 times a day.    FOLLOW UP:  May 17th @ 9:00 am    [facility] for the [facility]:  For Your Diabetes Education and Nutrition Appointments Call:  [phone]   For Diabetes Education or Nutrition Related Questions:   Phone: [phone]  E-mail: [email or web address]  Fax: [phone]   If you need a medication refill please contact your pharmacy. Please allow 3 business days for your refills to be completed.    Instructions for emailing the Diabetes Educators    If you need to communicate a non-urgent message to a Diabetes Educator via email, please send to [email or web address].    Please follow the following email guidelines:    Subject line: Secure: your clinic name (example: Secure: [facility])  In the email please include: First name, middle initial, last name and date of birth.    We will be in touch with you within one (1) business day.

## 2017-04-03 NOTE — PROGRESS NOTES
SUBJECTIVE:                                                    Vero Michaels is a 41 year old female who presents to clinic today for the following health issues:      HPI    Medication Followup of Phentermine    Taking Medication as prescribed: yes    Side Effects:  None    Medication Helping Symptoms:  yes       Problem list and histories reviewed & adjusted, as indicated.  Additional history: Answers for HPI/ROS submitted by the patient on 4/6/2017   If you checked off any problems, how difficult have these problems made it for you to do your work, take care of things at home, or get along with other people?: Not difficult at all  PHQ9 TOTAL SCORE: 0  ELIAS 7 TOTAL SCORE: 0      Patient Active Problem List   Diagnosis     Gestational diabetes mellitus, class A2     Depression     Former smoker     Impaired fasting glucose     Hypertriglyceridemia     Non morbid obesity     Type 2 diabetes mellitus without complication, without long-term current use of insulin (H)     Past Surgical History:   Procedure Laterality Date     CHOLECYSTECTOMY       GYN SURGERY      c-sec       Social History   Substance Use Topics     Smoking status: Former Smoker     Packs/day: 1.00     Types: Cigarettes     Quit date: 8/20/2011     Smokeless tobacco: Never Used     Alcohol use No     Family History   Problem Relation Age of Onset     DIABETES Brother      DIABETES Maternal Grandfather      DIABETES Maternal Uncle      DIABETES Paternal Grandfather      C.A.D. Paternal Grandfather      DIABETES Father      C.A.D. Father          Current Outpatient Prescriptions   Medication Sig Dispense Refill     phentermine 30 MG capsule Take 1 capsule (30 mg) by mouth every morning 30 capsule 0     blood glucose monitoring (ACCU-CHEK FASTCLIX) lancets Use to test blood sugar 2 times daily or as directed.  Ok to substitute alternative if insurance prefers. 1 Box prn     blood glucose monitoring (ACCU-CHEK SMARTVIEW) test strip Use to test blood sugar 2  times daily or as directed.  Ok to substitute alternative if insurance prefers. 100 strip 3     Fenofibrate Micronized 134 MG CAPS Take 1 capsule (134 mg) by mouth daily 90 capsule 3     atorvastatin (LIPITOR) 40 MG tablet Take 1 tablet (40 mg) by mouth daily 90 tablet 3     multivitamin, therapeutic with minerals (MULTI-VITAMIN) TABS Take 1 tablet by mouth daily       cyanocolbalamin (VITAMIN  B-12) 1000 MCG tablet Take  by mouth daily.       No Known Allergies  Recent Labs   Lab Test  01/05/17   0812  12/01/16   0756  12/10/15   0741 11/21/11   A1C   --   6.1*   --    --    LDL  45   --   28  28   HDL  30*   --   25*  25   TRIG  103   --   263*  424   ALT  23   --   24  21   CR  0.83   --   0.80  0.78   GFRESTIMATED  75   --   78   --    GFRESTBLACK  >90   GFR Calc     --   >90   GFR Calc     --    POTASSIUM  4.1   --   4.1  4.8   TSH  1.48   --   1.12  1.24      BP Readings from Last 3 Encounters:   04/06/17 118/76   02/02/17 126/80   01/06/17 110/74    Wt Readings from Last 3 Encounters:   04/06/17 200 lb (90.7 kg)   03/15/17 201 lb (91.2 kg)   02/22/17 208 lb 8 oz (94.6 kg)                  Labs reviewed in EPIC    ROS:  Constitutional, HEENT, cardiovascular, pulmonary, gi and gu systems are negative, except as otherwise noted.    OBJECTIVE:                                                    /76 (BP Location: Right arm, Patient Position: Chair, Cuff Size: Adult Large)  Pulse 78  Temp 98.3  F (36.8  C) (Oral)  Resp 14  Wt 200 lb (90.7 kg)  SpO2 100%  BMI 33.54 kg/m2  Body mass index is 33.54 kg/(m^2).  Physical Exam   Constitutional: She is oriented to person, place, and time. She appears well-developed and well-nourished.   HENT:   Head: Normocephalic and atraumatic.   Cardiovascular: Normal rate and regular rhythm.    Pulmonary/Chest: Effort normal and breath sounds normal.   Neurological: She is alert and oriented to person, place, and time.   Psychiatric: She has a  "normal mood and affect.         Diagnostic Test Results:  none      ASSESSMENT/PLAN:                                                      Problem List Items Addressed This Visit     Non morbid obesity     Has lost close to 20 pounds since last 3 months   Cut back on phenetermine to 30mg daily   Discussed diet and exercise regimen   Recheck in 1 month         Relevant Medications    phentermine 30 MG capsule    Type 2 diabetes mellitus without complication, without long-term current use of insulin (H)    Relevant Medications    phentermine 30 MG capsule      Other Visit Diagnoses     Need for prophylactic vaccination with tetanus-diphtheria (TD)    -  Primary             BMI:   Estimated body mass index is 33.54 kg/(m^2) as calculated from the following:    Height as of 3/15/17: 5' 4.75\" (1.645 m).    Weight as of this encounter: 200 lb (90.7 kg).   Weight management plan: Discussed healthy diet and exercise guidelines and patient will follow up in 1 month in clinic to re-evaluate.      See Patient Instructions    Helen Ng MD  [facility]  "

## 2017-04-06 ENCOUNTER — OFFICE VISIT (OUTPATIENT)
Dept: FAMILY MEDICINE | Facility: OTHER | Age: 42
End: 2017-04-06
Payer: COMMERCIAL

## 2017-04-06 VITALS
RESPIRATION RATE: 14 BRPM | HEART RATE: 78 BPM | DIASTOLIC BLOOD PRESSURE: 76 MMHG | SYSTOLIC BLOOD PRESSURE: 118 MMHG | OXYGEN SATURATION: 100 % | BODY MASS INDEX: 33.54 KG/M2 | TEMPERATURE: 98.3 F | WEIGHT: 200 LBS

## 2017-04-06 DIAGNOSIS — E11.9 TYPE 2 DIABETES MELLITUS WITHOUT COMPLICATION, WITHOUT LONG-TERM CURRENT USE OF INSULIN (H): ICD-10-CM

## 2017-04-06 DIAGNOSIS — E66.9 NON MORBID OBESITY, UNSPECIFIED OBESITY TYPE: ICD-10-CM

## 2017-04-06 DIAGNOSIS — Z23 NEED FOR PROPHYLACTIC VACCINATION WITH TETANUS-DIPHTHERIA (TD): Primary | ICD-10-CM

## 2017-04-06 PROCEDURE — 99213 OFFICE O/P EST LOW 20 MIN: CPT | Performed by: FAMILY MEDICINE

## 2017-04-06 RX ORDER — PHENTERMINE HYDROCHLORIDE 37.5 MG/1
37.5 CAPSULE ORAL EVERY MORNING
Qty: 30 CAPSULE | Refills: 0 | Status: CANCELLED | OUTPATIENT
Start: 2017-04-06

## 2017-04-06 RX ORDER — PHENTERMINE HYDROCHLORIDE 30 MG/1
30 CAPSULE ORAL EVERY MORNING
Qty: 30 CAPSULE | Refills: 0 | Status: SHIPPED | OUTPATIENT
Start: 2017-04-06 | End: 2017-11-10

## 2017-04-06 ASSESSMENT — PATIENT HEALTH QUESTIONNAIRE - PHQ9
10. IF YOU CHECKED OFF ANY PROBLEMS, HOW DIFFICULT HAVE THESE PROBLEMS MADE IT FOR YOU TO DO YOUR WORK, TAKE CARE OF THINGS AT HOME, OR GET ALONG WITH OTHER PEOPLE: NOT DIFFICULT AT ALL
SUM OF ALL RESPONSES TO PHQ QUESTIONS 1-9: 0

## 2017-04-06 ASSESSMENT — PAIN SCALES - GENERAL: PAINLEVEL: NO PAIN (0)

## 2017-04-06 ASSESSMENT — ANXIETY QUESTIONNAIRES
7. FEELING AFRAID AS IF SOMETHING AWFUL MIGHT HAPPEN: 0 = NOT AT ALL
GAD7 TOTAL SCORE: 0
GAD7 TOTAL SCORE: 0

## 2017-04-06 NOTE — ASSESSMENT & PLAN NOTE
Has lost close to 20 pounds since last 3 months   Cut back on phenetermine to 30mg daily   Discussed diet and exercise regimen   Recheck in 1 month

## 2017-04-06 NOTE — MR AVS SNAPSHOT
"              After Visit Summary   4/6/2017    Vero Michaels    MRN: [id number]           Patient Information     Date Of Birth          1975        Visit Information        Provider Department      4/6/2017 4:40 PM Helen Ng MD [facility]        Today's Diagnoses     Need for prophylactic vaccination with tetanus-diphtheria (TD)    -  1    Non morbid obesity, unspecified obesity type        Type 2 diabetes mellitus without complication, without long-term current use of insulin (H)           Follow-ups after your visit        Follow-up notes from your care team     Return in about 1 month (around 5/6/2017).      Your next 10 appointments already scheduled     May 17, 2017  9:00 AM CDT   Diabetic Education with ER DIABETIC ED RESOURCE   [facility] ([facility])    [address] [address] [phone]              Who to contact     If you have questions or need follow up information about today's clinic visit or your schedule please contact [facility] directly at [phone].  Normal or non-critical lab and imaging results will be communicated to you by MyChart, letter or phone within 4 business days after the clinic has received the results. If you do not hear from us within 7 days, please contact the clinic through [employer] or phone. If you have a critical or abnormal lab result, we will notify you by phone as soon as possible.  Submit refill requests through [employer] or call your pharmacy and they will forward the refill request to us. Please allow 3 business days for your refill to be completed.          Additional Information About Your Visit        MyChart Information     [facility] lets you send messages to your doctor, view your test results, renew your prescriptions, schedule appointments and more. To sign up, go to [email or web address] . Click on \"Log in\" on the left side of the screen, which will take you to the " "Welcome page. Then click on \"Sign up Now\" on the right side of the page.     You will be asked to enter the access code listed below, as well as some personal information. Please follow the directions to create your username and password.     Your access code is: [email or web address]  Expires: 2017 12:24 PM     Your access code will  in 90 days. If you need help or a new code, please call your [place] clinic or [phone].        Care EveryWhere ID     This is your Care EveryWhere ID. This could be used by other organizations to access your [place] medical records  [id number]        Your Vitals Were     Pulse Temperature Respirations Pulse Oximetry BMI (Body Mass Index)       78 98.3  F (36.8  C) (Oral) 14 100% 33.54 kg/m2        Blood Pressure from Last 3 Encounters:   17 118/76   17 126/80   17 110/74    Weight from Last 3 Encounters:   17 200 lb (90.7 kg)   03/15/17 201 lb (91.2 kg)   17 208 lb 8 oz (94.6 kg)              Today, you had the following     No orders found for display         Today's Medication Changes          These changes are accurate as of: 17  5:13 PM.  If you have any questions, ask your nurse or doctor.               Start taking these medicines.        Dose/Directions    ACE/ARB NOT PRESCRIBED (INTENTIONAL)   Used for:  Type 2 diabetes mellitus without complication, without long-term current use of insulin (H)   Started by:  Helen Ng MD        Please choose reason not prescribed, below   Refills:  0       phentermine 30 MG capsule   Used for:  Non morbid obesity, unspecified obesity type, Type 2 diabetes mellitus without complication, without long-term current use of insulin (H)   Started by:  Helen Ng MD        Dose:  30 mg   Take 1 capsule (30 mg) by mouth every morning   Quantity:  30 capsule   Refills:  0            Where to get your medicines      Some of these will need a paper prescription and others can be bought over the " counter.  Ask your nurse if you have questions.     Bring a paper prescription for each of these medications     phentermine 30 MG capsule       You don't need a prescription for these medications     ACE/ARB NOT PRESCRIBED (INTENTIONAL)                Primary Care Provider Office Phone # Fax #    Helen Ng [phone]       [facility] [address]    [facility] [address]        Thank you!     Thank you for choosing [facility]  for your care. Our goal is always to provide you with excellent care. Hearing back from our patients is one way we can continue to improve our services. Please take a few minutes to complete the written survey that you may receive in the mail after your visit with us. Thank you!             Your Updated Medication List - Protect others around you: Learn how to safely use, store and throw away your medicines at www.disposemymeds.org.          This list is accurate as of: 4/6/17  5:13 PM.  Always use your most recent med list.                   Brand Name Dispense Instructions for use    ACE/ARB NOT PRESCRIBED (INTENTIONAL)      Please choose reason not prescribed, below       atorvastatin 40 MG tablet    LIPITOR    90 tablet    Take 1 tablet (40 mg) by mouth daily       blood glucose monitoring lancets     1 Box    Use to test blood sugar 2 times daily or as directed.  Ok to substitute alternative if insurance prefers.       blood glucose monitoring test strip    ACCU-CHEK SMARTVIEW    100 strip    Use to test blood sugar 2 times daily or as directed.  Ok to substitute alternative if insurance prefers.       cyanocobalamin 1000 MCG tablet    vitamin  B-12     Take  by mouth daily.       Fenofibrate Micronized 134 MG Caps     90 capsule    Take 1 capsule (134 mg) by mouth daily       Multi-vitamin Tabs tablet      Take 1 tablet by mouth daily       phentermine 30 MG capsule     30 capsule    Take 1 capsule (30 mg) by mouth every  morning

## 2017-04-07 ASSESSMENT — PATIENT HEALTH QUESTIONNAIRE - PHQ9: SUM OF ALL RESPONSES TO PHQ QUESTIONS 1-9: 0

## 2017-04-07 ASSESSMENT — ANXIETY QUESTIONNAIRES: GAD7 TOTAL SCORE: 0

## 2017-09-22 ENCOUNTER — OFFICE VISIT (OUTPATIENT)
Dept: FAMILY MEDICINE | Facility: OTHER | Age: 42
End: 2017-09-22
Payer: COMMERCIAL

## 2017-09-22 VITALS
BODY MASS INDEX: 32.15 KG/M2 | DIASTOLIC BLOOD PRESSURE: 60 MMHG | TEMPERATURE: 97.2 F | HEIGHT: 65 IN | HEART RATE: 73 BPM | OXYGEN SATURATION: 99 % | SYSTOLIC BLOOD PRESSURE: 100 MMHG | WEIGHT: 193 LBS

## 2017-09-22 DIAGNOSIS — E11.9 TYPE 2 DIABETES MELLITUS WITHOUT COMPLICATION, WITHOUT LONG-TERM CURRENT USE OF INSULIN (H): ICD-10-CM

## 2017-09-22 DIAGNOSIS — Z23 NEED FOR PROPHYLACTIC VACCINATION AND INOCULATION AGAINST INFLUENZA: Primary | ICD-10-CM

## 2017-09-22 DIAGNOSIS — E78.1 HYPERTRIGLYCERIDEMIA: ICD-10-CM

## 2017-09-22 LAB — HBA1C MFR BLD: 5.9 % (ref 4.3–6)

## 2017-09-22 PROCEDURE — 90686 IIV4 VACC NO PRSV 0.5 ML IM: CPT | Performed by: FAMILY MEDICINE

## 2017-09-22 PROCEDURE — 83036 HEMOGLOBIN GLYCOSYLATED A1C: CPT | Performed by: FAMILY MEDICINE

## 2017-09-22 PROCEDURE — 90471 IMMUNIZATION ADMIN: CPT | Performed by: FAMILY MEDICINE

## 2017-09-22 PROCEDURE — 36415 COLL VENOUS BLD VENIPUNCTURE: CPT | Performed by: FAMILY MEDICINE

## 2017-09-22 PROCEDURE — 99214 OFFICE O/P EST MOD 30 MIN: CPT | Mod: 25 | Performed by: FAMILY MEDICINE

## 2017-09-22 RX ORDER — LANCETS
EACH MISCELLANEOUS
Qty: 100 EACH | Refills: 2 | Status: SHIPPED | OUTPATIENT
Start: 2017-09-22 | End: 2019-02-01

## 2017-09-22 RX ORDER — ATORVASTATIN CALCIUM 40 MG/1
40 TABLET, FILM COATED ORAL DAILY
Qty: 90 TABLET | Refills: 3 | Status: SHIPPED | OUTPATIENT
Start: 2017-09-22 | End: 2018-10-28

## 2017-09-22 RX ORDER — FENOFIBRATE 134 MG/1
134 CAPSULE ORAL DAILY
Qty: 90 CAPSULE | Refills: 3 | Status: SHIPPED | OUTPATIENT
Start: 2017-09-22 | End: 2018-09-21

## 2017-09-22 ASSESSMENT — PAIN SCALES - GENERAL: PAINLEVEL: NO PAIN (0)

## 2017-09-22 NOTE — PROGRESS NOTES
"  SUBJECTIVE:                                                    Vero Michaels is a 42 year old female who presents to clinic today for the following health issues:  {Provider please address medication reconciliation discrepancies--rooming staff please delete if no med/rec issues}    HPI    Diabetes Follow-up      Patient is checking blood sugars: { :653907}    Diabetic concerns: {Diabetic Concerns:353171::\"None\"}     Symptoms of hypoglycemia (low blood sugar): { :401726::\"none\"}     Paresthesias (numbness or burning in feet) or sores: { :034119::\"No\"}     Date of last diabetic eye exam: ***    Hyperlipidemia Follow-Up      Rate your low fat/cholesterol diet?: { :806448::\"good\"}    Taking statin?  { :562812::\"No\"}    Other lipid medications/supplements?:  { :957540::\"none\"}    {Depression and Anxiety Followup:940038}          Problem list and histories reviewed & adjusted, as indicated.  Additional history: {NONE - AS DOCUMENTED:587319::\"as documented\"}    {ACUTE Problem SUPERLIST - brief histories:445158}    {HIST REVIEW/ LINKS 2:405795}    {PROVIDER CHARTING PREFERENCE:360138}  "

## 2017-09-22 NOTE — PROGRESS NOTES
SUBJECTIVE:                                                    Vero Michaels is a 42 year old female who presents to clinic today for the following health issues:    Diabetes   Diabetes:     Frequency of checking blood sugars::  1 time a day (2 after she eats dinner. Average 120)    Diabetic concerns::  None    Hypoglycemia symptoms::  None    Paraesthesia present::  No    Eye Exam in the last year::  NO (Pt will schedule)  Hyperlipidemia:     Low fat/chol diet rating::  Good (Low carb)    Taking Statins::  YES (Lipitor)    Side effects from hypolipidemia medication::  No muscle aches from Statin    Lipid Medications or Supplements::  Fenofibrate, without side effects.  Diet::  Carbohydrate counting  Frequency of exercise::  2-3 days/week  Duration of exercise::  15-30 minutes  Taking medications regularly::  Yes  Medication side effects::  None  Additional concerns today::  YES (Pt has a pain present that feels like stabbing underneath her left breast. Pain is intermitten. )  History of Present Illness   Diabetes:     Frequency of checking blood sugars::  1 time a day (2 after she eats dinner. Average 120)    Diabetic concerns::  None    Hypoglycemia symptoms::  None    Paraesthesia present::  No    Eye Exam in the last year::  NO (Pt will schedule)  Hyperlipidemia:     Low fat/chol diet rating::  Good (Low carb)    Taking Statins::  YES (Lipitor)    Side effects from hypolipidemia medication::  No muscle aches from Statin    Lipid Medications or Supplements::  Fenofibrate, without side effects.  Diet::  Carbohydrate counting  Frequency of exercise::  2-3 days/week  Duration of exercise::  15-30 minutes  Taking medications regularly::  Yes  Medication side effects::  None  Additional concerns today::  YES (Pt has a pain present that feels like stabbing underneath her left breast. Pain is intermitten. )      -------------------------------------    Problem list and histories reviewed & adjusted, as  indicated.  Additional history: as documented        Patient Active Problem List   Diagnosis     Gestational diabetes mellitus, class A2     Depression     Former smoker     Impaired fasting glucose     Hypertriglyceridemia     Non morbid obesity     Type 2 diabetes mellitus without complication, without long-term current use of insulin (H)     Past Surgical History:   Procedure Laterality Date     CHOLECYSTECTOMY       GYN SURGERY      c-sec       Social History   Substance Use Topics     Smoking status: Former Smoker     Packs/day: 1.00     Types: Cigarettes     Quit date: 8/20/2011     Smokeless tobacco: Never Used     Alcohol use No     Family History   Problem Relation Age of Onset     DIABETES Brother      DIABETES Maternal Grandfather      DIABETES Maternal Uncle      DIABETES Paternal Grandfather      C.A.D. Paternal Grandfather      DIABETES Father      C.A.D. Father          Current Outpatient Prescriptions   Medication Sig Dispense Refill     blood glucose monitoring (ACCU-CHEK FASTCLIX) lancets Use to test blood sugar 2 times daily or as directed.  Ok to substitute alternative if insurance prefers. 100 each 2     blood glucose monitoring (ACCU-CHEK SMARTVIEW) test strip Use to test blood sugar 2 times daily or as directed.  Ok to substitute alternative if insurance prefers. 100 strip 3     Fenofibrate Micronized 134 MG CAPS Take 1 capsule (134 mg) by mouth daily 90 capsule 3     atorvastatin (LIPITOR) 40 MG tablet Take 1 tablet (40 mg) by mouth daily 90 tablet 3     ACE/ARB NOT PRESCRIBED, INTENTIONAL, Please choose reason not prescribed, below       multivitamin, therapeutic with minerals (MULTI-VITAMIN) TABS Take 1 tablet by mouth daily       cyanocolbalamin (VITAMIN  B-12) 1000 MCG tablet Take  by mouth daily.       phentermine 30 MG capsule Take 1 capsule (30 mg) by mouth every morning (Patient not taking: Reported on 9/22/2017) 30 capsule 0     No Known Allergies  Recent Labs   Lab Test  09/22/17    "1118  01/05/17   0812  12/01/16   0756  12/10/15   0741 11/21/11   A1C  5.9   --   6.1*   --    --    LDL   --   45   --   28  28   HDL   --   30*   --   25*  25   TRIG   --   103   --   263*  424   ALT   --   23   --   24  21   CR   --   0.83   --   0.80  0.78   GFRESTIMATED   --   75   --   78   --    GFRESTBLACK   --   >90   GFR Calc     --   >90   GFR Calc     --    POTASSIUM   --   4.1   --   4.1  4.8   TSH   --   1.48   --   1.12  1.24      BP Readings from Last 3 Encounters:   09/22/17 100/60   04/06/17 118/76   02/02/17 126/80    Wt Readings from Last 3 Encounters:   09/22/17 193 lb (87.5 kg)   04/06/17 200 lb (90.7 kg)   03/15/17 201 lb (91.2 kg)                  Labs reviewed in EPIC          ROS:  Constitutional, HEENT, cardiovascular, pulmonary, GI, , musculoskeletal, neuro, skin, endocrine and psych systems are negative, except as otherwise noted.      OBJECTIVE:   /60 (BP Location: Right arm, Patient Position: Sitting, Cuff Size: Adult Regular)  Pulse 73  Temp 97.2  F (36.2  C) (Temporal)  Ht 5' 4.75\" (1.645 m)  Wt 193 lb (87.5 kg)  SpO2 99%  BMI 32.37 kg/m2  Body mass index is 32.37 kg/(m^2).   GENERAL: healthy, alert and no distress  EYES: Eyes grossly normal to inspection, PERRL and conjunctivae and sclerae normal  HENT: ear canals and TM's normal, nose and mouth without ulcers or lesions  NECK: no adenopathy, no asymmetry, masses, or scars and thyroid normal to palpation  RESP: lungs clear to auscultation - no rales, rhonchi or wheezes  BREAST: normal without masses, tenderness or nipple discharge and no palpable axillary masses or adenopathy  CV: regular rate and rhythm, normal S1 S2, no S3 or S4, no murmur, click or rub, no peripheral edema and peripheral pulses strong  ABDOMEN: soft, nontender, no hepatosplenomegaly, no masses and bowel sounds normal  MS: no gross musculoskeletal defects noted, no edema  SKIN: no suspicious lesions or rashes  NEURO: " "Normal strength and tone, mentation intact and speech normal  PSYCH: mentation appears normal, affect normal/bright    Diagnostic Test Results:  none     ASSESSMENT/PLAN:     Problem List Items Addressed This Visit     Hypertriglyceridemia    Relevant Medications    Fenofibrate Micronized 134 MG CAPS    atorvastatin (LIPITOR) 40 MG tablet    Type 2 diabetes mellitus without complication, without long-term current use of insulin (H)     -New diagnosis - a1c- 5.9- diet controlled  -Home fasting Blood sugars- not checking  -Last eye exam-Will schedule. Sees [facility] eye [facility]  -Diabetic foot exam -negative  -Last urine microalbumin- check today  -Last LDL-continue statin  -Continue current medications- Diet controlled. continue phentermine -Refills provided  -Discussed diet , lifestyle modifications, exercise and weight loss  -RTC in 3  months for follow up             Relevant Medications    blood glucose monitoring (ACCU-CHEK FASTCLIX) lancets    blood glucose monitoring (ACCU-CHEK SMARTVIEW) test strip    atorvastatin (LIPITOR) 40 MG tablet    Other Relevant Orders    Hemoglobin A1c (Completed)      Other Visit Diagnoses     Need for prophylactic vaccination and inoculation against influenza    -  Primary    Relevant Orders    FLU VAC, SPLIT VIRUS IM > 3 YO (QUADRIVALENT) [76577] (Completed)    Vaccine Administration, Initial [99282] (Completed)           BMI:   Estimated body mass index is 32.37 kg/(m^2) as calculated from the following:    Height as of this encounter: 5' 4.75\" (1.645 m).    Weight as of this encounter: 193 lb (87.5 kg).   Weight management plan: Discussed healthy diet and exercise guidelines and patient will follow up in 6 months in clinic to re-evaluate.        See Patient Instructions    Helen Ng MD  [facility]  Injectable Influenza Immunization Documentation    1.  Is the person to be vaccinated sick today?   No    2. Does the person to be vaccinated have an allergy to a " component   of the vaccine?   No    3. Has the person to be vaccinated ever had a serious reaction   to influenza vaccine in the past?   No    4. Has the person to be vaccinated ever had Guillain-Barré syndrome?   No    Prior to injection verified patient identity using patient's name and date of birth.  Per orders of Dr. Ng, injection of FLU given by Miladis Figueroa. Patient instructed to remain in clinic for 15 minutes afterwards, and to report any adverse reaction to me immediately.      Form completed by   Miladis Figueroa MA  September 22, 2017

## 2017-09-22 NOTE — NURSING NOTE
"Chief Complaint   Patient presents with     Lipids     Diabetes       Initial /60 (BP Location: Right arm, Patient Position: Sitting, Cuff Size: Adult Regular)  Pulse 73  Temp 97.2  F (36.2  C) (Temporal)  Ht 5' 4.75\" (1.645 m)  Wt 193 lb (87.5 kg)  SpO2 99%  BMI 32.37 kg/m2 Estimated body mass index is 32.37 kg/(m^2) as calculated from the following:    Height as of this encounter: 5' 4.75\" (1.645 m).    Weight as of this encounter: 193 lb (87.5 kg).  Medication Reconciliation: incomplete   Miladis Figueroa MA  September 22, 2017      "

## 2017-09-22 NOTE — MR AVS SNAPSHOT
"              After Visit Summary   9/22/2017    Vero Michaels    MRN: [id number]           Patient Information     Date Of Birth          1975        Visit Information        Provider Department      9/22/2017 10:40 AM Helen Ng MD [facility]        Today's Diagnoses     Need for prophylactic vaccination and inoculation against influenza    -  1    Type 2 diabetes mellitus without complication, without long-term current use of insulin (H)        Hypertriglyceridemia           Follow-ups after your visit        Follow-up notes from your care team     Return in about 2 years (around 9/22/2019).      Who to contact     If you have questions or need follow up information about today's clinic visit or your schedule please contact [facility] directly at [phone].  Normal or non-critical lab and imaging results will be communicated to you by [employer], letter or phone within 4 business days after the clinic has received the results. If you do not hear from us within 7 days, please contact the clinic through [employer] or phone. If you have a critical or abnormal lab result, we will notify you by phone as soon as possible.  Submit refill requests through [employer] or call your pharmacy and they will forward the refill request to us. Please allow 3 business days for your refill to be completed.          Additional Information About Your Visit        MyChart Information     [employer] lets you send messages to your doctor, view your test results, renew your prescriptions, schedule appointments and more. To sign up, go to [email or web address] . Click on \"Log in\" on the left side of the screen, which will take you to the Welcome page. Then click on \"Sign up Now\" on the right side of the page.     You will be asked to enter the access code listed below, as well as some personal information. Please follow the directions to create your username and password.     Your access code is: " [email or web address]  Expires: 2017 11:17 AM     Your access code will  in 90 days. If you need help or a new code, please call your [place] clinic or [phone].        Care EveryWhere ID     This is your Care EveryWhere ID. This could be used by other organizations to access your [place] medical records  [id number]        Your Vitals Were     Pulse Temperature Height Pulse Oximetry BMI (Body Mass Index)       73 97.2  F (36.2  C) (Temporal) 5' 4.75\" (1.645 m) 99% 32.37 kg/m2        Blood Pressure from Last 3 Encounters:   17 100/60   17 118/76   17 126/80    Weight from Last 3 Encounters:   17 193 lb (87.5 kg)   17 200 lb (90.7 kg)   03/15/17 201 lb (91.2 kg)              We Performed the Following     FLU VAC, SPLIT VIRUS IM > 3 YO (QUADRIVALENT) [19641]     Hemoglobin A1c     Vaccine Administration, Initial [51237]          Where to get your medicines      These medications were sent to [facility] [address] [place] [id number] [address] [address]     Phone:  [phone]     atorvastatin 40 MG tablet    blood glucose monitoring lancets    blood glucose monitoring test strip    Fenofibrate Micronized 134 MG Caps          Primary Care Provider Office Phone # Fax #    Helen Ng [phone]       [address] [place] [address]  [facility] [id number]        Equal Access to Services     [facility] : Hadii aad ku hadasho Soomaali, waaxda luqadaha, qaybta kaalmada chandrakant vaughn. So [facility] [phone].    ATENCIÓN: Si habla español, tiene a galvez disposición servicios gratuitos de asistencia lingüística. Llame al [phone].    We comply with applicable federal civil rights laws and [place] laws. We do not discriminate on the basis of race, color, national origin, age, disability sex, sexual orientation or gender identity.            Thank you!     Thank you for choosing [facility]  for your care. Our goal is always to provide you with excellent care. Hearing back from our patients is one way we can continue to improve our services. Please take a few minutes to complete the written survey that you may receive in the mail after your visit with us. Thank you!             Your Updated Medication List - Protect others around you: Learn how to safely use, store and throw away your medicines at www.disposemymeds.org.          This list is accurate as of: 9/22/17 11:17 AM.  Always use your most recent med list.                   Brand Name Dispense Instructions for use Diagnosis    ACE/ARB NOT PRESCRIBED (INTENTIONAL)      Please choose reason not prescribed, below    Type 2 diabetes mellitus without complication, without long-term current use of insulin (H)       atorvastatin 40 MG tablet    LIPITOR    90 tablet    Take 1 tablet (40 mg) by mouth daily    Hypertriglyceridemia, Type 2 diabetes mellitus without complication, without long-term current use of insulin (H)       blood glucose monitoring lancets     100 each    Use to test blood sugar 2 times daily or as directed.  Ok to substitute alternative if insurance prefers.    Type 2 diabetes mellitus without complication, without long-term current use of insulin (H)       blood glucose monitoring test strip    ACCU-CHEK SMARTVIEW    100 strip    Use to test blood sugar 2 times daily or as directed.  Ok to substitute alternative if insurance prefers.    Type 2 diabetes mellitus without complication, without long-term current use of insulin (H)       cyanocobalamin 1000 MCG tablet    vitamin  B-12     Take  by mouth daily.        Fenofibrate Micronized 134 MG Caps     90 capsule    Take 1 capsule (134 mg) by mouth daily    Hypertriglyceridemia       Multi-vitamin Tabs tablet      Take 1 tablet by mouth daily        phentermine 30 MG capsule     30 capsule    Take 1 capsule (30 mg) by mouth every morning    Non morbid obesity, unspecified  obesity type, Type 2 diabetes mellitus without complication, without long-term current use of insulin (H)

## 2017-10-02 NOTE — ASSESSMENT & PLAN NOTE
-New diagnosis - a1c- 5.9- diet controlled  -Home fasting Blood sugars- not checking  -Last eye exam-Will schedule. Sees [facility] eye clinic  -Diabetic foot exam -negative  -Last urine microalbumin- check today  -Last LDL-continue statin  -Continue current medications- Diet controlled. continue phentermine -Refills provided  -Discussed diet , lifestyle modifications, exercise and weight loss  -RTC in 3  months for follow up

## 2017-11-03 ENCOUNTER — TRANSFERRED RECORDS (OUTPATIENT)
Dept: HEALTH INFORMATION MANAGEMENT | Facility: CLINIC | Age: 42
End: 2017-11-03

## 2017-11-03 NOTE — PROGRESS NOTES
SUBJECTIVE:                                                    Vero Michaels is a 42 year old female who presents to clinic today for the following health issues:      HPI     Answers for HPI/ROS submitted by the patient on 11/7/2017   If you checked off any problems, how difficult have these problems made it for you to do your work, take care of things at home, or get along with other people?: Not difficult at all  PHQ9 TOTAL SCORE: 0    Patient would like to start medication again today.    Medication Followup of Phentermine 30 MG    Taking Medication as prescribed:     Side Effects:  None    Medication Helping Symptoms:  Yes- when was on the medication         Problem list and histories reviewed & adjusted, as indicated.  Additional history: as documented        Patient Active Problem List   Diagnosis     Gestational diabetes mellitus, class A2     Depression     Former smoker     Impaired fasting glucose     Hypertriglyceridemia     Non morbid obesity     Type 2 diabetes mellitus without complication, without long-term current use of insulin (H)     Past Surgical History:   Procedure Laterality Date     CHOLECYSTECTOMY       GYN SURGERY      c-sec       Social History   Substance Use Topics     Smoking status: Former Smoker     Packs/day: 1.00     Types: Cigarettes     Quit date: 8/20/2011     Smokeless tobacco: Never Used     Alcohol use No     Family History   Problem Relation Age of Onset     DIABETES Brother      DIABETES Maternal Grandfather      DIABETES Maternal Uncle      DIABETES Paternal Grandfather      C.A.D. Paternal Grandfather      DIABETES Father      C.A.D. Father          Current Outpatient Prescriptions   Medication Sig Dispense Refill     phentermine 30 MG capsule Take 1 capsule (30 mg) by mouth every morning 30 capsule 0     blood glucose monitoring (ACCU-CHEK FASTCLIX) lancets Use to test blood sugar 2 times daily or as directed.  Ok to substitute alternative if insurance prefers. 100 each 2  "    blood glucose monitoring (ACCU-CHEK SMARTVIEW) test strip Use to test blood sugar 2 times daily or as directed.  Ok to substitute alternative if insurance prefers. 100 strip 3     Fenofibrate Micronized 134 MG CAPS Take 1 capsule (134 mg) by mouth daily 90 capsule 3     atorvastatin (LIPITOR) 40 MG tablet Take 1 tablet (40 mg) by mouth daily 90 tablet 3     ACE/ARB NOT PRESCRIBED, INTENTIONAL, Please choose reason not prescribed, below       multivitamin, therapeutic with minerals (MULTI-VITAMIN) TABS Take 1 tablet by mouth daily       cyanocolbalamin (VITAMIN  B-12) 1000 MCG tablet Take  by mouth daily.       No Known Allergies  Recent Labs   Lab Test  09/22/17   1118  01/05/17   0812  12/01/16   0756  12/10/15   0741 11/21/11   A1C  5.9   --   6.1*   --    --    LDL   --   45   --   28  28   HDL   --   30*   --   25*  25   TRIG   --   103   --   263*  424   ALT   --   23   --   24  21   CR   --   0.83   --   0.80  0.78   GFRESTIMATED   --   75   --   78   --    GFRESTBLACK   --   >90   GFR Calc     --   >90   GFR Calc     --    POTASSIUM   --   4.1   --   4.1  4.8   TSH   --   1.48   --   1.12  1.24      BP Readings from Last 3 Encounters:   11/10/17 108/64   09/22/17 100/60   04/06/17 118/76    Wt Readings from Last 3 Encounters:   11/10/17 195 lb (88.5 kg)   09/22/17 193 lb (87.5 kg)   04/06/17 200 lb (90.7 kg)                  Labs reviewed in EPIC          ROS:  Constitutional, HEENT, cardiovascular, pulmonary, GI, , musculoskeletal, neuro, skin, endocrine and psych systems are negative, except as otherwise noted.      OBJECTIVE:   /64 (BP Location: Right arm, Patient Position: Chair, Cuff Size: Adult Regular)  Pulse 74  Temp 98.2  F (36.8  C) (Oral)  Resp 16  Ht 5' 4.75\" (1.645 m)  Wt 195 lb (88.5 kg)  SpO2 100%  BMI 32.7 kg/m2  Body mass index is 32.7 kg/(m^2).   Physical Exam   Constitutional: She appears well-developed and well-nourished.   HENT:   Head: " Normocephalic and atraumatic.   Cardiovascular: Normal rate, regular rhythm and normal heart sounds.    Pulmonary/Chest: Effort normal and breath sounds normal.   Psychiatric: She has a normal mood and affect.         Diagnostic Test Results:  none     ASSESSMENT/PLAN:     Problem List Items Addressed This Visit     Non morbid obesity     She has been consistent in maintaining  her weight but is concerned about some weight gain over the holidays and would like to try and go back on phentermine for the holidays  Again discussed dietary and exercise recommendations  Recheck in 1 month         Relevant Medications    phentermine 30 MG capsule    Type 2 diabetes mellitus without complication, without long-term current use of insulin (H)    Relevant Medications    phentermine 30 MG capsule      Other Visit Diagnoses     Need for prophylactic vaccination with tetanus-diphtheria (TD)    -  Primary    Non morbid obesity, unspecified obesity type        Relevant Medications    phentermine 30 MG capsule           Helen Ng MD  [facility]

## 2017-11-07 ASSESSMENT — PATIENT HEALTH QUESTIONNAIRE - PHQ9
SUM OF ALL RESPONSES TO PHQ QUESTIONS 1-9: 0
10. IF YOU CHECKED OFF ANY PROBLEMS, HOW DIFFICULT HAVE THESE PROBLEMS MADE IT FOR YOU TO DO YOUR WORK, TAKE CARE OF THINGS AT HOME, OR GET ALONG WITH OTHER PEOPLE: NOT DIFFICULT AT ALL
SUM OF ALL RESPONSES TO PHQ QUESTIONS 1-9: 0

## 2017-11-08 ASSESSMENT — PATIENT HEALTH QUESTIONNAIRE - PHQ9: SUM OF ALL RESPONSES TO PHQ QUESTIONS 1-9: 0

## 2017-11-10 ENCOUNTER — OFFICE VISIT (OUTPATIENT)
Dept: FAMILY MEDICINE | Facility: OTHER | Age: 42
End: 2017-11-10
Payer: COMMERCIAL

## 2017-11-10 VITALS
HEIGHT: 65 IN | SYSTOLIC BLOOD PRESSURE: 108 MMHG | HEART RATE: 74 BPM | WEIGHT: 195 LBS | OXYGEN SATURATION: 100 % | DIASTOLIC BLOOD PRESSURE: 64 MMHG | RESPIRATION RATE: 16 BRPM | BODY MASS INDEX: 32.49 KG/M2 | TEMPERATURE: 98.2 F

## 2017-11-10 DIAGNOSIS — Z23 NEED FOR PROPHYLACTIC VACCINATION WITH TETANUS-DIPHTHERIA (TD): Primary | ICD-10-CM

## 2017-11-10 DIAGNOSIS — E66.9 NON MORBID OBESITY: ICD-10-CM

## 2017-11-10 DIAGNOSIS — E66.9 NON MORBID OBESITY, UNSPECIFIED OBESITY TYPE: ICD-10-CM

## 2017-11-10 DIAGNOSIS — E11.9 TYPE 2 DIABETES MELLITUS WITHOUT COMPLICATION, WITHOUT LONG-TERM CURRENT USE OF INSULIN (H): ICD-10-CM

## 2017-11-10 PROCEDURE — 99213 OFFICE O/P EST LOW 20 MIN: CPT | Performed by: FAMILY MEDICINE

## 2017-11-10 RX ORDER — PHENTERMINE HYDROCHLORIDE 30 MG/1
30 CAPSULE ORAL EVERY MORNING
Qty: 30 CAPSULE | Refills: 0 | Status: SHIPPED | OUTPATIENT
Start: 2017-11-10 | End: 2017-12-08

## 2017-11-10 ASSESSMENT — PAIN SCALES - GENERAL: PAINLEVEL: NO PAIN (0)

## 2017-11-10 NOTE — NURSING NOTE
"Chief Complaint   Patient presents with     Recheck Medication     Panel Management     tdap, eye exam, phq9       Initial /64 (BP Location: Right arm, Patient Position: Chair, Cuff Size: Adult Regular)  Pulse 74  Temp 98.2  F (36.8  C) (Oral)  Resp 16  Ht 5' 4.75\" (1.645 m)  Wt 195 lb (88.5 kg)  SpO2 100%  BMI 32.7 kg/m2 Estimated body mass index is 32.7 kg/(m^2) as calculated from the following:    Height as of this encounter: 5' 4.75\" (1.645 m).    Weight as of this encounter: 195 lb (88.5 kg).  Medication Reconciliation: complete   Radha Lucero CMA (AAMA)      "

## 2017-11-10 NOTE — ASSESSMENT & PLAN NOTE
She has been consistent in maintaining  her weight but is concerned about some weight gain over the holidays and would like to try and go back on phentermine for the holidays  Again discussed dietary and exercise recommendations  Recheck in 1 month

## 2017-11-10 NOTE — MR AVS SNAPSHOT
After Visit Summary   11/10/2017    Vreo Michaels    MRN: [id number]           Patient Information     Date Of Birth          1975        Visit Information        Provider Department      11/10/2017 9:40 AM Helen Ng MD [facility]        Today's Diagnoses     Need for prophylactic vaccination with tetanus-diphtheria (TD)    -  1    Non morbid obesity, unspecified obesity type        Type 2 diabetes mellitus without complication, without long-term current use of insulin (H)           Follow-ups after your visit        Who to contact     If you have questions or need follow up information about today's clinic visit or your schedule please contact [facility] directly at [phone].  Normal or non-critical lab and imaging results will be communicated to you by [employer], letter or phone within 4 business days after the clinic has received the results. If you do not hear from us within 7 days, please contact the clinic through [employer] or phone. If you have a critical or abnormal lab result, we will notify you by phone as soon as possible.  Submit refill requests through [employer] or call your pharmacy and they will forward the refill request to us. Please allow 3 business days for your refill to be completed.          Additional Information About Your Visit        MyChart Information     [employer] gives you secure access to your electronic health record. If you see a primary care provider, you can also send messages to your care team and make appointments. If you have questions, please call your primary care clinic.  If you do not have a primary care provider, please call [phone] and they will assist you.        Care EveryWhere ID     This is your Care EveryWhere ID. This could be used by other organizations to access your [place] medical records  [id number]        Your Vitals Were     Pulse Temperature Respirations Height Pulse Oximetry BMI (Body Mass Index)     "74 98.2  F (36.8  C) (Oral) 16 5' 4.75\" (1.645 m) 100% 32.7 kg/m2       Blood Pressure from Last 3 Encounters:   11/10/17 108/64   09/22/17 100/60   04/06/17 118/76    Weight from Last 3 Encounters:   11/10/17 195 lb (88.5 kg)   09/22/17 193 lb (87.5 kg)   04/06/17 200 lb (90.7 kg)              Today, you had the following     No orders found for display         Where to get your medicines      Some of these will need a paper prescription and others can be bought over the counter.  Ask your nurse if you have questions.     Bring a paper prescription for each of these medications     phentermine 30 MG capsule          Primary Care Provider Office Phone # Fax #    Helen Ng [phone]       [address] [facility] [address]  [facility] [address]        Equal Access to Services     [facility] : Hadii kushal juárzeo Solidia, waaxda lualfredo, qaybta kaalmada adeheidy, chandrakant heath . So [facility] [phone].    ATENCIÓN: Si habla español, tiene a galvez disposición servicios gratuitos de asistencia lingüística. Llame al [phone].    We comply with applicable federal civil rights laws and [place] laws. We do not discriminate on the basis of race, color, national origin, age, disability, sex, sexual orientation, or gender identity.            Thank you!     Thank you for choosing [facility]  for your care. Our goal is always to provide you with excellent care. Hearing back from our patients is one way we can continue to improve our services. Please take a few minutes to complete the written survey that you may receive in the mail after your visit with us. Thank you!             Your Updated Medication List - Protect others around you: Learn how to safely use, store and throw away your medicines at www.disposemymeds.org.          This list is accurate as of: 11/10/17 10:18 AM.  Always use your most recent med list.                   Brand Name Dispense Instructions " for use Diagnosis    ACE/ARB/ARNI NOT PRESCRIBED (INTENTIONAL)      Please choose reason not prescribed, below    Type 2 diabetes mellitus without complication, without long-term current use of insulin (H)       atorvastatin 40 MG tablet    LIPITOR    90 tablet    Take 1 tablet (40 mg) by mouth daily    Hypertriglyceridemia, Type 2 diabetes mellitus without complication, without long-term current use of insulin (H)       blood glucose monitoring lancets     100 each    Use to test blood sugar 2 times daily or as directed.  Ok to substitute alternative if insurance prefers.    Type 2 diabetes mellitus without complication, without long-term current use of insulin (H)       blood glucose monitoring test strip    ACCU-CHEK SMARTVIEW    100 strip    Use to test blood sugar 2 times daily or as directed.  Ok to substitute alternative if insurance prefers.    Type 2 diabetes mellitus without complication, without long-term current use of insulin (H)       cyanocobalamin 1000 MCG tablet    vitamin  B-12     Take  by mouth daily.        Fenofibrate Micronized 134 MG Caps     90 capsule    Take 1 capsule (134 mg) by mouth daily    Hypertriglyceridemia       Multi-vitamin Tabs tablet      Take 1 tablet by mouth daily        phentermine 30 MG capsule     30 capsule    Take 1 capsule (30 mg) by mouth every morning    Non morbid obesity, unspecified obesity type, Type 2 diabetes mellitus without complication, without long-term current use of insulin (H)

## 2017-11-30 NOTE — PROGRESS NOTES
SUBJECTIVE:                                                    Vero Michaels is a 42 year old female who presents to clinic today for the following health issues:      HPI    Medication Followup of Phentermine 30 MG    Taking Medication as prescribed: yes    Side Effects:  None    Medication Helping Symptoms:  yes         Problem list and histories reviewed & adjusted, as indicated.  Additional history: as documented      Patient Active Problem List   Diagnosis     Gestational diabetes mellitus, class A2     Depression     Former smoker     Impaired fasting glucose     Hypertriglyceridemia     Non morbid obesity     Type 2 diabetes mellitus without complication, without long-term current use of insulin (H)     Past Surgical History:   Procedure Laterality Date     CHOLECYSTECTOMY       GYN SURGERY      c-sec       Social History   Substance Use Topics     Smoking status: Former Smoker     Packs/day: 1.00     Types: Cigarettes     Quit date: 8/20/2011     Smokeless tobacco: Never Used     Alcohol use No     Family History   Problem Relation Age of Onset     DIABETES Brother      DIABETES Maternal Grandfather      DIABETES Maternal Uncle      DIABETES Paternal Grandfather      C.A.D. Paternal Grandfather      DIABETES Father      C.A.D. Father          Current Outpatient Prescriptions   Medication Sig Dispense Refill     phentermine 30 MG capsule Take 1 capsule (30 mg) by mouth every morning 30 capsule 0     blood glucose monitoring (ACCU-CHEK FASTCLIX) lancets Use to test blood sugar 2 times daily or as directed.  Ok to substitute alternative if insurance prefers. 100 each 2     blood glucose monitoring (ACCU-CHEK SMARTVIEW) test strip Use to test blood sugar 2 times daily or as directed.  Ok to substitute alternative if insurance prefers. 100 strip 3     Fenofibrate Micronized 134 MG CAPS Take 1 capsule (134 mg) by mouth daily 90 capsule 3     atorvastatin (LIPITOR) 40 MG tablet Take 1 tablet (40 mg) by mouth daily  "90 tablet 3     ACE/ARB NOT PRESCRIBED, INTENTIONAL, Please choose reason not prescribed, below       multivitamin, therapeutic with minerals (MULTI-VITAMIN) TABS Take 1 tablet by mouth daily       cyanocolbalamin (VITAMIN  B-12) 1000 MCG tablet Take  by mouth daily.       No Known Allergies  BP Readings from Last 3 Encounters:   12/08/17 108/70   11/10/17 108/64   09/22/17 100/60    Wt Readings from Last 3 Encounters:   12/08/17 191 lb (86.6 kg)   11/10/17 195 lb (88.5 kg)   09/22/17 193 lb (87.5 kg)                  Labs reviewed in EPIC        ROS:  Constitutional, HEENT, cardiovascular, pulmonary, GI, , musculoskeletal, neuro, skin, endocrine and psych systems are negative, except as otherwise noted.      OBJECTIVE:   /70 (BP Location: Left arm, Patient Position: Chair, Cuff Size: Adult Regular)  Pulse 69  Temp 98.2  F (36.8  C) (Oral)  Resp 16  Ht 5' 4.7\" (1.643 m)  Wt 191 lb (86.6 kg)  SpO2 98%  BMI 32.08 kg/m2  Body mass index is 32.08 kg/(m^2).   Physical Exam   Constitutional: She is oriented to person, place, and time. She appears well-developed and well-nourished.   HENT:   Head: Normocephalic and atraumatic.   Eyes: EOM are normal.   Cardiovascular: Normal rate and regular rhythm.    Pulmonary/Chest: Effort normal and breath sounds normal.   Musculoskeletal: Normal range of motion.   Neurological: She is alert and oriented to person, place, and time.   Psychiatric: She has a normal mood and affect.         Diagnostic Test Results:  none     ASSESSMENT/PLAN:     Problem List Items Addressed This Visit     Non morbid obesity     She continues to loose weight on phenetermine  No side effects  Refill meds  Recheck in 1 month  Will start tapering down on the dose after 1 month         Relevant Medications    phentermine 30 MG capsule    Type 2 diabetes mellitus without complication, without long-term current use of insulin (H)    Relevant Medications    phentermine 30 MG capsule      Other " Visit Diagnoses     Need for prophylactic vaccination with tetanus-diphtheria (TD)    -  Primary    Non morbid obesity, unspecified obesity type        Relevant Medications    phentermine 30 MG capsule        Helen Ng MD  [facility]

## 2017-12-08 ENCOUNTER — OFFICE VISIT (OUTPATIENT)
Dept: FAMILY MEDICINE | Facility: OTHER | Age: 42
End: 2017-12-08
Payer: COMMERCIAL

## 2017-12-08 VITALS
SYSTOLIC BLOOD PRESSURE: 108 MMHG | WEIGHT: 191 LBS | HEIGHT: 65 IN | HEART RATE: 69 BPM | BODY MASS INDEX: 31.82 KG/M2 | TEMPERATURE: 98.2 F | RESPIRATION RATE: 16 BRPM | OXYGEN SATURATION: 98 % | DIASTOLIC BLOOD PRESSURE: 70 MMHG

## 2017-12-08 DIAGNOSIS — Z23 NEED FOR PROPHYLACTIC VACCINATION WITH TETANUS-DIPHTHERIA (TD): Primary | ICD-10-CM

## 2017-12-08 DIAGNOSIS — E66.9 NON MORBID OBESITY, UNSPECIFIED OBESITY TYPE: ICD-10-CM

## 2017-12-08 DIAGNOSIS — E11.9 TYPE 2 DIABETES MELLITUS WITHOUT COMPLICATION, WITHOUT LONG-TERM CURRENT USE OF INSULIN (H): ICD-10-CM

## 2017-12-08 DIAGNOSIS — E66.9 NON MORBID OBESITY: ICD-10-CM

## 2017-12-08 PROCEDURE — 99213 OFFICE O/P EST LOW 20 MIN: CPT | Performed by: FAMILY MEDICINE

## 2017-12-08 RX ORDER — PHENTERMINE HYDROCHLORIDE 30 MG/1
30 CAPSULE ORAL EVERY MORNING
Qty: 30 CAPSULE | Refills: 0 | Status: SHIPPED | OUTPATIENT
Start: 2017-12-08 | End: 2018-01-05

## 2017-12-08 ASSESSMENT — PAIN SCALES - GENERAL: PAINLEVEL: NO PAIN (0)

## 2017-12-08 NOTE — MR AVS SNAPSHOT
After Visit Summary   12/8/2017    Vero Michaels    MRN: [id number]           Patient Information     Date Of Birth          1975        Visit Information        Provider Department      12/8/2017 9:40 AM Helen Ng MD [facility]        Today's Diagnoses     Need for prophylactic vaccination with tetanus-diphtheria (TD)    -  1    Non morbid obesity, unspecified obesity type        Type 2 diabetes mellitus without complication, without long-term current use of insulin (H)           Follow-ups after your visit        Follow-up notes from your care team     Return in about 1 month (around 1/8/2018).      Who to contact     If you have questions or need follow up information about today's clinic visit or your schedule please contact [facility] directly at [phone].  Normal or non-critical lab and imaging results will be communicated to you by MyChart, letter or phone within 4 business days after the clinic has received the results. If you do not hear from us within 7 days, please contact the clinic through MyChart or phone. If you have a critical or abnormal lab result, we will notify you by phone as soon as possible.  Submit refill requests through [employer] or call your pharmacy and they will forward the refill request to us. Please allow 3 business days for your refill to be completed.          Additional Information About Your Visit        MyChart Information     [employer] gives you secure access to your electronic health record. If you see a primary care provider, you can also send messages to your care team and make appointments. If you have questions, please call your primary care clinic.  If you do not have a primary care provider, please call [phone] and they will assist you.        Care EveryWhere ID     This is your Care EveryWhere ID. This could be used by other organizations to access your [place] medical records  [id number]        Your Vitals  "Were     Pulse Temperature Respirations Height Pulse Oximetry BMI (Body Mass Index)    69 98.2  F (36.8  C) (Oral) 16 5' 4.7\" (1.643 m) 98% 32.08 kg/m2       Blood Pressure from Last 3 Encounters:   12/08/17 108/70   11/10/17 108/64   09/22/17 100/60    Weight from Last 3 Encounters:   12/08/17 191 lb (86.6 kg)   11/10/17 195 lb (88.5 kg)   09/22/17 193 lb (87.5 kg)              Today, you had the following     No orders found for display         Where to get your medicines      Some of these will need a paper prescription and others can be bought over the counter.  Ask your nurse if you have questions.     Bring a paper prescription for each of these medications     phentermine 30 MG capsule          Primary Care Provider Office Phone # Fax #    Helen Ng [phone]       [address] [place] [id number]        Equal Access to Services     [facility]: Hadii aad ku hadasho Soomaali, waaxda luqadaha, qaybta kaalmada adeegyada, chandrakant heath . So [facility] [phone].    ATENCIÓN: Si habla español, tiene a galvez disposición servicios gratuitos de asistencia lingüística. LlSt. Elizabeth Hospital [phone].    We comply with applicable federal civil rights laws and [place] laws. We do not discriminate on the basis of race, color, national origin, age, disability, sex, sexual orientation, or gender identity.            Thank you!     Thank you for choosing [facility]  for your care. Our goal is always to provide you with excellent care. Hearing back from our patients is one way we can continue to improve our services. Please take a few minutes to complete the written survey that you may receive in the mail after your visit with us. Thank you!             Your Updated Medication List - Protect others around you: Learn how to safely use, store and throw away your medicines at www.disposemymeds.org.          This list is accurate as of: 12/8/17 10:10 AM.  " Always use your most recent med list.                   Brand Name Dispense Instructions for use Diagnosis    ACE/ARB/ARNI NOT PRESCRIBED (INTENTIONAL)      Please choose reason not prescribed, below    Type 2 diabetes mellitus without complication, without long-term current use of insulin (H)       atorvastatin 40 MG tablet    LIPITOR    90 tablet    Take 1 tablet (40 mg) by mouth daily    Hypertriglyceridemia, Type 2 diabetes mellitus without complication, without long-term current use of insulin (H)       blood glucose monitoring lancets     100 each    Use to test blood sugar 2 times daily or as directed.  Ok to substitute alternative if insurance prefers.    Type 2 diabetes mellitus without complication, without long-term current use of insulin (H)       blood glucose monitoring test strip    ACCU-CHEK SMARTVIEW    100 strip    Use to test blood sugar 2 times daily or as directed.  Ok to substitute alternative if insurance prefers.    Type 2 diabetes mellitus without complication, without long-term current use of insulin (H)       cyanocobalamin 1000 MCG tablet    vitamin  B-12     Take  by mouth daily.        Fenofibrate Micronized 134 MG Caps     90 capsule    Take 1 capsule (134 mg) by mouth daily    Hypertriglyceridemia       Multi-vitamin Tabs tablet      Take 1 tablet by mouth daily        phentermine 30 MG capsule     30 capsule    Take 1 capsule (30 mg) by mouth every morning    Non morbid obesity, unspecified obesity type, Type 2 diabetes mellitus without complication, without long-term current use of insulin (H)

## 2017-12-08 NOTE — NURSING NOTE
"Chief Complaint   Patient presents with     Recheck Medication     Health Maintenance     tdap       Initial /70 (BP Location: Left arm, Patient Position: Chair, Cuff Size: Adult Regular)  Pulse 69  Temp 98.2  F (36.8  C) (Oral)  Resp 16  Ht 5' 4.7\" (1.643 m)  Wt 191 lb (86.6 kg)  SpO2 98%  BMI 32.08 kg/m2 Estimated body mass index is 32.08 kg/(m^2) as calculated from the following:    Height as of this encounter: 5' 4.7\" (1.643 m).    Weight as of this encounter: 191 lb (86.6 kg).  Medication Reconciliation: complete   Radha Lucero CMA (AAMA)      "

## 2017-12-11 NOTE — ASSESSMENT & PLAN NOTE
She continues to loose weight on phenetermine  No side effects  Refill meds  Recheck in 1 month  Will start tapering down on the dose after 1 month

## 2017-12-29 NOTE — PROGRESS NOTES
SUBJECTIVE:                                                    Vero Michaels is a 42 year old female who presents to clinic today for the following health issues:      HPI    Medication Followup of Phentermine 30 MG    Taking Medication as prescribed: yes    Side Effects:  None    Medication Helping Symptoms:  Was on a higher dose before, doesn't seem to be helping currently         Problem list and histories reviewed & adjusted, as indicated.  Additional history: as documented      Patient Active Problem List   Diagnosis     Gestational diabetes mellitus, class A2     Depression     Former smoker     Impaired fasting glucose     Hypertriglyceridemia     Non morbid obesity     Type 2 diabetes mellitus without complication, without long-term current use of insulin (H)     Past Surgical History:   Procedure Laterality Date     CHOLECYSTECTOMY       GYN SURGERY      c-sec       Social History   Substance Use Topics     Smoking status: Former Smoker     Packs/day: 1.00     Types: Cigarettes     Quit date: 8/20/2011     Smokeless tobacco: Never Used     Alcohol use No     Family History   Problem Relation Age of Onset     DIABETES Brother      DIABETES Maternal Grandfather      DIABETES Maternal Uncle      DIABETES Paternal Grandfather      C.A.D. Paternal Grandfather      DIABETES Father      C.A.D. Father          Current Outpatient Prescriptions   Medication Sig Dispense Refill     phentermine (ADIPEX-P) 37.5 MG tablet Take 1 tablet (37.5 mg) by mouth every morning (before breakfast) 30 tablet 0     blood glucose monitoring (ACCU-CHEK FASTCLIX) lancets Use to test blood sugar 2 times daily or as directed.  Ok to substitute alternative if insurance prefers. 100 each 2     blood glucose monitoring (ACCU-CHEK SMARTVIEW) test strip Use to test blood sugar 2 times daily or as directed.  Ok to substitute alternative if insurance prefers. 100 strip 3     Fenofibrate Micronized 134 MG CAPS Take 1 capsule (134 mg) by mouth  "daily 90 capsule 3     atorvastatin (LIPITOR) 40 MG tablet Take 1 tablet (40 mg) by mouth daily 90 tablet 3     ACE/ARB NOT PRESCRIBED, INTENTIONAL, Please choose reason not prescribed, below       multivitamin, therapeutic with minerals (MULTI-VITAMIN) TABS Take 1 tablet by mouth daily       cyanocolbalamin (VITAMIN  B-12) 1000 MCG tablet Take  by mouth daily.       No Known Allergies  BP Readings from Last 3 Encounters:   01/05/18 110/74   12/08/17 108/70   11/10/17 108/64    Wt Readings from Last 3 Encounters:   01/05/18 192 lb (87.1 kg)   12/08/17 191 lb (86.6 kg)   11/10/17 195 lb (88.5 kg)                  Labs reviewed in EPIC        ROS:  Constitutional, HEENT, cardiovascular, pulmonary, GI, , musculoskeletal, neuro, skin, endocrine and psych systems are negative, except as otherwise noted.      OBJECTIVE:   /74 (BP Location: Left arm, Patient Position: Chair, Cuff Size: Adult Regular)  Pulse 76  Temp 98  F (36.7  C) (Oral)  Resp 16  Ht 5' 4.6\" (1.641 m)  Wt 192 lb (87.1 kg)  SpO2 100%  BMI 32.35 kg/m2  Body mass index is 32.35 kg/(m^2).   Physical Exam   Constitutional: She is oriented to person, place, and time. She appears well-developed and well-nourished.   HENT:   Head: Normocephalic and atraumatic.   Cardiovascular: Normal rate, regular rhythm, normal heart sounds and intact distal pulses.  Exam reveals no gallop and no friction rub.    No murmur heard.  Pulmonary/Chest: Effort normal and breath sounds normal.   Neurological: She is alert and oriented to person, place, and time.   Psychiatric: She has a normal mood and affect.         Diagnostic Test Results:  none     ASSESSMENT/PLAN:     Problem List Items Addressed This Visit     Non morbid obesity     Stable weight   Continue phentermine for 1 more month and quit  No side effects  mainting weights to help maitain her sugars through diet alone (has diabetes)         Relevant Medications    phentermine (ADIPEX-P) 37.5 MG tablet    " Type 2 diabetes mellitus without complication, without long-term current use of insulin (H)    Relevant Orders    Lipid panel reflex to direct LDL Fasting    Albumin Random Urine Quantitative with Creat Ratio    Basic metabolic panel    Hemoglobin A1c      Other Visit Diagnoses     Screening for diabetic peripheral neuropathy    -  Primary    Relevant Orders    FOOT EXAM  NO CHARGE [40409.081]    Class 1 obesity without serious comorbidity with body mass index (BMI) of 32.0 to 32.9 in adult, unspecified obesity type        Relevant Medications    phentermine (ADIPEX-P) 37.5 MG tablet         Helen Ng MD  [facility]

## 2018-01-05 ENCOUNTER — OFFICE VISIT (OUTPATIENT)
Dept: FAMILY MEDICINE | Facility: OTHER | Age: 43
End: 2018-01-05
Payer: COMMERCIAL

## 2018-01-05 VITALS
DIASTOLIC BLOOD PRESSURE: 74 MMHG | RESPIRATION RATE: 16 BRPM | BODY MASS INDEX: 31.99 KG/M2 | OXYGEN SATURATION: 100 % | HEART RATE: 76 BPM | SYSTOLIC BLOOD PRESSURE: 110 MMHG | HEIGHT: 65 IN | TEMPERATURE: 98 F | WEIGHT: 192 LBS

## 2018-01-05 DIAGNOSIS — Z13.89 SCREENING FOR DIABETIC PERIPHERAL NEUROPATHY: Primary | ICD-10-CM

## 2018-01-05 DIAGNOSIS — E11.9 TYPE 2 DIABETES MELLITUS WITHOUT COMPLICATION, WITHOUT LONG-TERM CURRENT USE OF INSULIN (H): ICD-10-CM

## 2018-01-05 DIAGNOSIS — E66.9 NON MORBID OBESITY: ICD-10-CM

## 2018-01-05 DIAGNOSIS — E66.811 CLASS 1 OBESITY WITHOUT SERIOUS COMORBIDITY WITH BODY MASS INDEX (BMI) OF 32.0 TO 32.9 IN ADULT, UNSPECIFIED OBESITY TYPE: ICD-10-CM

## 2018-01-05 PROCEDURE — 99213 OFFICE O/P EST LOW 20 MIN: CPT | Performed by: FAMILY MEDICINE

## 2018-01-05 RX ORDER — PHENTERMINE HYDROCHLORIDE 37.5 MG/1
37.5 TABLET ORAL
Qty: 30 TABLET | Refills: 0 | Status: SHIPPED | OUTPATIENT
Start: 2018-01-05 | End: 2018-07-26

## 2018-01-05 ASSESSMENT — PAIN SCALES - GENERAL: PAINLEVEL: NO PAIN (0)

## 2018-01-05 NOTE — MR AVS SNAPSHOT
After Visit Summary   1/5/2018    Vero Michaels    MRN: [id number]           Patient Information     Date Of Birth          1975        Visit Information        Provider Department      1/5/2018 9:40 AM Helen Ng MD [facility]        Today's Diagnoses     Screening for diabetic peripheral neuropathy    -  1    Type 2 diabetes mellitus without complication, without long-term current use of insulin (H)        Class 1 obesity without serious comorbidity with body mass index (BMI) of 32.0 to 32.9 in adult, unspecified obesity type           Follow-ups after your visit        Follow-up notes from your care team     Return in about 2 months (around 3/5/2018).      Future tests that were ordered for you today     Open Future Orders        Priority Expected Expires Ordered    Albumin Random Urine Quantitative with Creat Ratio Routine  3/29/2018 1/5/2018    DEPRESSION ACTION PLAN (DAP) Routine  12/29/2018 1/5/2018    Basic metabolic panel Routine  1/5/2019 1/5/2018    Hemoglobin A1c Routine  1/5/2019 1/5/2018    FOOT EXAM  NO CHARGE [20747.114] Routine  12/29/2018 1/5/2018    Lipid panel reflex to direct LDL Fasting Routine  3/29/2018 1/5/2018            Who to contact     If you have questions or need follow up information about today's clinic visit or your schedule please contact [facility] directly at [phone].  Normal or non-critical lab and imaging results will be communicated to you by MyChart, letter or phone within 4 business days after the clinic has received the results. If you do not hear from us within 7 days, please contact the clinic through MyChart or phone. If you have a critical or abnormal lab result, we will notify you by phone as soon as possible.  Submit refill requests through [employer] or call your pharmacy and they will forward the refill request to us. Please allow 3 business days for your refill to be completed.          Additional  "Information About Your Visit        [employer] Information     [employer] gives you secure access to your electronic health record. If you see a primary care provider, you can also send messages to your care team and make appointments. If you have questions, please call your primary care clinic.  If you do not have a primary care provider, please call [phone] and they will assist you.        Care EveryWhere ID     This is your Care EveryWhere ID. This could be used by other organizations to access your [facility] medical records  [id number]        Your Vitals Were     Pulse Temperature Respirations Height Pulse Oximetry BMI (Body Mass Index)    76 98  F (36.7  C) (Oral) 16 5' 4.6\" (1.641 m) 100% 32.35 kg/m2       Blood Pressure from Last 3 Encounters:   01/05/18 110/74   12/08/17 108/70   11/10/17 108/64    Weight from Last 3 Encounters:   01/05/18 192 lb (87.1 kg)   12/08/17 191 lb (86.6 kg)   11/10/17 195 lb (88.5 kg)                 Today's Medication Changes          These changes are accurate as of: 1/5/18 10:15 AM.  If you have any questions, ask your nurse or doctor.               Start taking these medicines.        Dose/Directions    phentermine 37.5 MG tablet   Commonly known as:  ADIPEX-P   Used for:  Class 1 obesity without serious comorbidity with body mass index (BMI) of 32.0 to 32.9 in adult, unspecified obesity type   Replaces:  phentermine 30 MG capsule   Started by:  Helen Ng MD        Dose:  37.5 mg   Take 1 tablet (37.5 mg) by mouth every morning (before breakfast)   Quantity:  30 tablet   Refills:  0         Stop taking these medicines if you haven't already. Please contact your care team if you have questions.     phentermine 30 MG capsule   Replaced by:  phentermine 37.5 MG tablet   Stopped by:  Helen Ng MD                Where to get your medicines      Some of these will need a paper prescription and others can be bought over the counter.  Ask your nurse if you have " questions.     Bring a paper prescription for each of these medications     phentermine 37.5 MG tablet                Primary Care Provider Office Phone # Fax #    Helen Ng [phone]       [address] [place] [id number]        Equal Access to Services     ASTON PRESLEY : Daniel harrison hadasho Soomaali, waaxda luqadaha, qaybta kaalmada adeegyada, chandrakant medina. So [facility] [phone].    ATENCIÓN: Si habla español, tiene a galvez disposición servicios gratuitos de asistencia lingüística. Llame al [phone].    We comply with applicable federal civil rights laws and [place] laws. We do not discriminate on the basis of race, color, national origin, age, disability, sex, sexual orientation, or gender identity.            Thank you!     Thank you for choosing [facility]  for your care. Our goal is always to provide you with excellent care. Hearing back from our patients is one way we can continue to improve our services. Please take a few minutes to complete the written survey that you may receive in the mail after your visit with us. Thank you!             Your Updated Medication List - Protect others around you: Learn how to safely use, store and throw away your medicines at www.disposemymeds.org.          This list is accurate as of: 1/5/18 10:15 AM.  Always use your most recent med list.                   Brand Name Dispense Instructions for use Diagnosis    ACE/ARB/ARNI NOT PRESCRIBED (INTENTIONAL)      Please choose reason not prescribed, below    Type 2 diabetes mellitus without complication, without long-term current use of insulin (H)       atorvastatin 40 MG tablet    LIPITOR    90 tablet    Take 1 tablet (40 mg) by mouth daily    Hypertriglyceridemia, Type 2 diabetes mellitus without complication, without long-term current use of insulin (H)       blood glucose monitoring lancets     100 each    Use to test blood sugar 2 times  daily or as directed.  Ok to substitute alternative if insurance prefers.    Type 2 diabetes mellitus without complication, without long-term current use of insulin (H)       blood glucose monitoring test strip    ACCU-CHEK SMARTVIEW    100 strip    Use to test blood sugar 2 times daily or as directed.  Ok to substitute alternative if insurance prefers.    Type 2 diabetes mellitus without complication, without long-term current use of insulin (H)       cyanocobalamin 1000 MCG tablet    vitamin  B-12     Take  by mouth daily.        Fenofibrate Micronized 134 MG Caps     90 capsule    Take 1 capsule (134 mg) by mouth daily    Hypertriglyceridemia       Multi-vitamin Tabs tablet      Take 1 tablet by mouth daily        phentermine 37.5 MG tablet    ADIPEX-P    30 tablet    Take 1 tablet (37.5 mg) by mouth every morning (before breakfast)    Class 1 obesity without serious comorbidity with body mass index (BMI) of 32.0 to 32.9 in adult, unspecified obesity type

## 2018-01-05 NOTE — LETTER
My Depression Action Plan  Name: Vero Michaels   Date of Birth 1975  Date: 12/29/2017    My doctor: Helen Ng   My clinic: [address]  [facility] [address] [phone]          GREEN    ZONE   Good Control    What it looks like:     Things are going generally well. You have normal up s and down s. You may even feel depressed from time to time, but bad moods usually last less than a day.   What you need to do:  1. Continue to care for yourself (see self care plan)  2. Check your depression survival kit and update it as needed  3. Follow your physician s recommendations including any medication.  4. Do not stop taking medication unless you consult with your physician first.           YELLOW         ZONE Getting Worse    What it looks like:     Depression is starting to interfere with your life.     It may be hard to get out of bed; you may be starting to isolate yourself from others.    Symptoms of depression are starting to last most all day and this has happened for several days.     You may have suicidal thoughts but they are not constant.   What you need to do:     1. Call your care team, your response to treatment will improve if you keep your care team informed of your progress. Yellow periods are signs an adjustment may need to be made.     2. Continue your self-care, even if you have to fake it!    3. Talk to someone in your support network    4. Open up your depression survival kit           RED    ZONE Medical Alert - Get Help    What it looks like:     Depression is seriously interfering with your life.     You may experience these or other symptoms: You can t get out of bed most days, can t work or engage in other necessary activities, you have trouble taking care of basic hygiene, or basic responsibilities, thoughts of suicide or death that will not go away, self-injurious behavior.     What you need to do:  1. Call your care team and  request a same-day appointment. If they are not available (weekends or after hours) call your local crisis line, emergency room or 911.      Electronically signed by: Roxane Milian, December 29, 2017    Depression Self Care Plan / Survival Kit    Self-Care for Depression  Here s the deal. Your body and mind are really not as separate as most people think.  What you do and think affects how you feel and how you feel influences what you do and think. This means if you do things that people who feel good do, it will help you feel better.  Sometimes this is all it takes.  There is also a place for medication and therapy depending on how severe your depression is, so be sure to consult with your medical provider and/ or Behavioral Health Consultant if your symptoms are worsening or not improving.     In order to better manage my stress, I will:    Exercise  Get some form of exercise, every day. This will help reduce pain and release endorphins, the  feel good  chemicals in your brain. This is almost as good as taking antidepressants!  This is not the same as joining a gym and then never going! (they count on that by the way ) It can be as simple as just going for a walk or doing some gardening, anything that will get you moving.      Hygiene   Maintain good hygiene (Get out of bed in the morning, Make your bed, Brush your teeth, Take a shower, and Get dressed like you were going to work, even if you are unemployed).  If your clothes don't fit try to get ones that do.    Diet  I will strive to eat foods that are good for me, drink plenty of water, and avoid excessive sugar, caffeine, alcohol, and other mood-altering substances.  Some foods that are helpful in depression are: complex carbohydrates, B vitamins, flaxseed, fish or fish oil, fresh fruits and vegetables.    Psychotherapy  I agree to participate in Individual Therapy (if recommended).    Medication  If prescribed medications, I agree to take them.  Missing  doses can result in serious side effects.  I understand that drinking alcohol, or other illicit drug use, may cause potential side effects.  I will not stop my medication abruptly without first discussing it with my provider.    Staying Connected With Others  I will stay in touch with my friends, family members, and my primary care provider/team.    Use your imagination  Be creative.  We all have a creative side; it doesn t matter if it s oil painting, sand castles, or mud pies! This will also kick up the endorphins.    Witness Beauty  (AKA stop and smell the roses) Take a look outside, even in mid-winter. Notice colors, textures. Watch the squirrels and birds.     Service to others  Be of service to others.  There is always someone else in need.  By helping others we can  get out of ourselves  and remember the really important things.  This also provides opportunities for practicing all the other parts of the program.    Humor  Laugh and be silly!  Adjust your TV habits for less news and crime-drama and more comedy.    Control your stress  Try breathing deep, massage therapy, biofeedback, and meditation. Find time to relax each day.     My support system    Clinic Contact:  Phone number:    Contact 1:  Phone number:    Contact 2:  Phone number:    Christianity/:  Phone number:    Therapist:  Phone number:    Local crisis center:    Phone number:    Other community support:  Phone number:

## 2018-01-05 NOTE — NURSING NOTE
"Chief Complaint   Patient presents with     Refill Request     phentermine     Panel Management     tdap, lipid, cmp, microalbumin, foot exam, dap, height       Initial /74 (BP Location: Left arm, Patient Position: Chair, Cuff Size: Adult Regular)  Pulse 76  Temp 98  F (36.7  C) (Oral)  Resp 16  Ht 5' 4.6\" (1.641 m)  Wt 192 lb (87.1 kg)  SpO2 100%  BMI 32.35 kg/m2 Estimated body mass index is 32.35 kg/(m^2) as calculated from the following:    Height as of this encounter: 5' 4.6\" (1.641 m).    Weight as of this encounter: 192 lb (87.1 kg).  Medication Reconciliation: complete   Radha Lucero CMA (AAMA)      "

## 2018-01-11 NOTE — ASSESSMENT & PLAN NOTE
Stable weight   Continue phentermine for 1 more month and quit  No side effects  mainting weights to help maitain her sugars through diet alone (has diabetes)

## 2018-03-30 ENCOUNTER — OFFICE VISIT (OUTPATIENT)
Dept: URGENT CARE | Facility: RETAIL CLINIC | Age: 43
End: 2018-03-30
Payer: COMMERCIAL

## 2018-03-30 VITALS — DIASTOLIC BLOOD PRESSURE: 67 MMHG | HEART RATE: 71 BPM | TEMPERATURE: 99.1 F | SYSTOLIC BLOOD PRESSURE: 116 MMHG

## 2018-03-30 DIAGNOSIS — J02.9 ACUTE PHARYNGITIS, UNSPECIFIED ETIOLOGY: Primary | ICD-10-CM

## 2018-03-30 LAB — S PYO AG THROAT QL IA.RAPID: NEGATIVE

## 2018-03-30 PROCEDURE — 99213 OFFICE O/P EST LOW 20 MIN: CPT | Performed by: PHYSICIAN ASSISTANT

## 2018-03-30 PROCEDURE — 87880 STREP A ASSAY W/OPTIC: CPT | Mod: QW | Performed by: PHYSICIAN ASSISTANT

## 2018-03-30 PROCEDURE — 87081 CULTURE SCREEN ONLY: CPT | Performed by: PHYSICIAN ASSISTANT

## 2018-03-30 NOTE — MR AVS SNAPSHOT
After Visit Summary   3/30/2018    Vero Michaels    MRN: [id number]           Patient Information     Date Of Birth          1975        Visit Information        Provider Department      3/30/2018 10:00 AM Elvia Cosby PA-C [facility] [place]        Today's Diagnoses     Acute pharyngitis, unspecified etiology    -  1      Care Instructions    Rapid strep test today is negative.   Your throat culture is pending. [facility] will call you if positive results to start antibiotics at that time.  No phone call if strep culture is negative  Symptomatic treat with fluids, rest, salt water gargles, lozenges, and over the counter pain reliever, such as tylenol or ibuprofen as needed.   Please follow up with primary care provider if not improving, worsening or new symptoms           Follow-ups after your visit        Who to contact     You can reach your care team any time of the day by calling [phone].  Notification of test results:  If you have an abnormal lab result, we will notify you by phone as soon as possible.         Additional Information About Your Visit        MyChart Information     [employer] gives you secure access to your electronic health record. If you see a primary care provider, you can also send messages to your care team and make appointments. If you have questions, please call your primary care clinic.  If you do not have a primary care provider, please call [phone] and they will assist you.        Care EveryWhere ID     This is your Care EveryWhere ID. This could be used by other organizations to access your [facility] medical records  [id number]        Your Vitals Were     Pulse Temperature                71 99.1  F (37.3  C) (Oral)           Blood Pressure from Last 3 Encounters:   03/30/18 116/67   01/05/18 110/74   12/08/17 108/70    Weight from Last 3 Encounters:   01/05/18 192 lb (87.1 kg)   12/08/17 191 lb (86.6 kg)   11/10/17 195 lb (88.5 kg)               We Performed the Following     BETA STREP GROUP A R/O CULTURE     RAPID STREP SCREEN        Primary Care Provider Office Phone # Fax #    Helen Ng [phone]       [address] [facility] [address]  [facility] [address]        Equal Access to Services     ASTON PRESLEY : Hadii aad ku hadjenniferguerrero Somaryali, waaxda luqadaha, qaybta kaalmada adeegyada, chandrakant rodriguezlaramarkie medina. So [facility] [phone].    ATENCIÓN: Si habla español, tiene a galvez disposición servicios gratuitos de asistencia lingüística. Llame al [phone].    We comply with applicable federal civil rights laws and [place] laws. We do not discriminate on the basis of race, color, national origin, age, disability, sex, sexual orientation, or gender identity.            Thank you!     Thank you for choosing [facility]  for your care. Our goal is always to provide you with excellent care. Hearing back from our patients is one way we can continue to improve our services. Please take a few minutes to complete the written survey that you may receive in the mail after your visit with us. Thank you!             Your Updated Medication List - Protect others around you: Learn how to safely use, store and throw away your medicines at www.disposemymeds.org.          This list is accurate as of 3/30/18 11:15 AM.  Always use your most recent med list.                   Brand Name Dispense Instructions for use Diagnosis    ACE/ARB/ARNI NOT PRESCRIBED (INTENTIONAL)      Please choose reason not prescribed, below    Type 2 diabetes mellitus without complication, without long-term current use of insulin (H)       atorvastatin 40 MG tablet    LIPITOR    90 tablet    Take 1 tablet (40 mg) by mouth daily    Hypertriglyceridemia, Type 2 diabetes mellitus without complication, without long-term current use of insulin (H)       blood glucose monitoring lancets     100 each    Use to test blood sugar 2 times daily or as  directed.  Ok to substitute alternative if insurance prefers.    Type 2 diabetes mellitus without complication, without long-term current use of insulin (H)       blood glucose monitoring test strip    ACCU-CHEK SMARTVIEW    100 strip    Use to test blood sugar 2 times daily or as directed.  Ok to substitute alternative if insurance prefers.    Type 2 diabetes mellitus without complication, without long-term current use of insulin (H)       Fenofibrate Micronized 134 MG Caps     90 capsule    Take 1 capsule (134 mg) by mouth daily    Hypertriglyceridemia       Multi-vitamin Tabs tablet      Take 1 tablet by mouth daily        phentermine 37.5 MG tablet    ADIPEX-P    30 tablet    Take 1 tablet (37.5 mg) by mouth every morning (before breakfast)    Class 1 obesity without serious comorbidity with body mass index (BMI) of 32.0 to 32.9 in adult, unspecified obesity type

## 2018-03-30 NOTE — NURSING NOTE
"Chief Complaint   Patient presents with     Throat Pain     on and off; 2-3 days     Mass     sore glands     Headache     yesterday, in back of neck       Initial /67 (BP Location: Left arm)  Pulse 71  Temp 99.1  F (37.3  C) (Oral) Estimated body mass index is 32.35 kg/(m^2) as calculated from the following:    Height as of 1/5/18: 5' 4.6\" (1.641 m).    Weight as of 1/5/18: 192 lb (87.1 kg).  Medication Reconciliation: complete  "

## 2018-03-30 NOTE — PATIENT INSTRUCTIONS
Rapid strep test today is negative.   Your throat culture is pending. [facility] will call you if positive results to start antibiotics at that time.  No phone call if strep culture is negative  Symptomatic treat with fluids, rest, salt water gargles, lozenges, and over the counter pain reliever, such as tylenol or ibuprofen as needed.   Please follow up with primary care provider if not improving, worsening or new symptoms

## 2018-03-30 NOTE — PROGRESS NOTES
Chief Complaint   Patient presents with     Throat Pain     on and off; 2-3 days     Mass     sore glands     Headache     yesterday, in back of neck        SUBJECTIVE:  Vero Michaels is a 42 year old female with a chief complaint of sore throat.  Onset of symptoms was 2 day(s) ago.    Course of illness: on and off, worsening today Severity moderate  Current and Associated symptoms: sore throat, swollen glands, headache  Treatment measures tried include Fluids, tea.  Predisposing factors include None.  PMH: Type 2 Diabetes mellitus, diet controlled and hypertriglceridemia- controlled on med    Past Medical History:   Diagnosis Date     Gestational diabetes mellitus, class A2 7/15/2008     Hypertriglyceridemia 12/13/2015     Type 2 diabetes mellitus without complication, without long-term current use of insulin (H) 12/9/2016     Current Outpatient Prescriptions   Medication Sig Dispense Refill     blood glucose monitoring (ACCU-CHEK FASTCLIX) lancets Use to test blood sugar 2 times daily or as directed.  Ok to substitute alternative if insurance prefers. 100 each 2     blood glucose monitoring (ACCU-CHEK SMARTVIEW) test strip Use to test blood sugar 2 times daily or as directed.  Ok to substitute alternative if insurance prefers. 100 strip 3     Fenofibrate Micronized 134 MG CAPS Take 1 capsule (134 mg) by mouth daily 90 capsule 3     atorvastatin (LIPITOR) 40 MG tablet Take 1 tablet (40 mg) by mouth daily 90 tablet 3     phentermine (ADIPEX-P) 37.5 MG tablet Take 1 tablet (37.5 mg) by mouth every morning (before breakfast) (Patient not taking: Reported on 3/30/2018) 30 tablet 0     ACE/ARB NOT PRESCRIBED, INTENTIONAL, Please choose reason not prescribed, below (Patient not taking: Reported on 3/30/2018)       multivitamin, therapeutic with minerals (MULTI-VITAMIN) TABS Take 1 tablet by mouth daily        No Known Allergies     History   Smoking Status     Former Smoker     Packs/day: 1.00     Types: Cigarettes     Quit  date: 8/20/2011   Smokeless Tobacco     Never Used       ROS:  CONSTITUTIONAL:NEGATIVE for fever, chills  ENT/MOUTH: POSITIVE for sore throat and swollen glands and NEGATIVE for ear pain bilateral and nasal congestion  RESP:NEGATIVE for significant cough or wheezing    OBJECTIVE:   /67 (BP Location: Left arm)  Pulse 71  Temp 99.1  F (37.3  C) (Oral)  GENERAL APPEARANCE: alert, healthy  EYES: conjunctiva clear  HENT: ear canals and TM's normal.  Nose normal.  Pharynx mildly erythematous with some exudate noted.  NECK: supple, tender to palpation, small adenopathy noted  RESP: lungs clear to auscultation - no rales, rhonchi or wheezes  CV: regular rates and rhythm, normal S1 S2, no murmur noted  SKIN: no suspicious lesions or rashes    Rapid Strep test is negative; await throat culture results.    ASSESSMENT:  Acute pharyngitis, unspecified etiology    PLAN:   Rapid strep test today is negative.   Your throat culture is pending. [facility] will call you if positive results to start antibiotics at that time.  No phone call if strep culture is negative  Symptomatic treat with fluids, rest, salt water gargles, lozenges, and over the counter pain reliever, such as tylenol or ibuprofen as needed.   Please follow up with primary care provider if not improving, worsening or new symptoms   Elvia Cosby PA-C  [facility]

## 2018-04-01 LAB
BACTERIA SPEC CULT: NORMAL
SPECIMEN SOURCE: NORMAL

## 2018-06-14 ENCOUNTER — TELEPHONE (OUTPATIENT)
Dept: FAMILY MEDICINE | Facility: OTHER | Age: 43
End: 2018-06-14

## 2018-06-14 NOTE — TELEPHONE ENCOUNTER
Panel Management Review      Patient has the following on her problem list:     Summary:    Patient is due/failing the following:   CMP, HIV, A1C, LDL and PHQ9    Action needed:   Patient needs office visit for Diabetes/Depression. Patient needs to do PHQ9. and Patient needs fasting lab only appointment.    Type of outreach:    Phone, left message for patient to call back.     Questions for provider review:    None                                                                                                                           Radha Lucero CMA ([facility])     Chart routed to Care Team .    Depression / Dysthymia review    Measure:  Needs PHQ-9 score of 4 or less during index window.  Administer PHQ-9 and if score is 5 or more, send encounter to provider for next steps.    5 - 7 month window range:     PHQ-9 SCORE 12/9/2016 4/6/2017 11/7/2017   Total Score - - -   Total Score MyChart - 0 0   Total Score 11 - 0       If PHQ-9 recheck is 5 or more, route to provider for next steps.    Patient is due for:  PHQ9    Diabetes    ASA: Passed    Last A1C  Lab Results   Component Value Date    A1C 5.9 09/22/2017    A1C 6.1 12/01/2016    A1C 5.5 07/08/2008     A1C tested: Passed    Last LDL:    Lab Results   Component Value Date    CHOL 96 01/05/2017     Lab Results   Component Value Date    HDL 30 01/05/2017     Lab Results   Component Value Date    LDL 45 01/05/2017     Lab Results   Component Value Date    TRIG 103 01/05/2017     No results found for: CHOLHDLRATIO  Lab Results   Component Value Date    NHDL 66 01/05/2017       Is the patient on a Statin? YES             Is the patient on Aspirin? NO    Medications     HMG CoA Reductase Inhibitors    atorvastatin (LIPITOR) 40 MG tablet          Last three blood pressure readings:  BP Readings from Last 3 Encounters:   03/30/18 116/67   01/05/18 110/74   12/08/17 108/70       Date of last diabetes office visit: 1/5/18     Tobacco History:     History   Smoking  Status     Former Smoker     Packs/day: 1.00     Types: Cigarettes     Quit date: 8/20/2011   Smokeless Tobacco     Never Used           Composite cancer screening  Chart review shows that this patient is due/due soon for the following None

## 2018-07-19 NOTE — TELEPHONE ENCOUNTER
LM for patient to return phone call to clinic about message below.  Radha Lucero CMA ([facility])

## 2018-07-23 NOTE — PROGRESS NOTES
SUBJECTIVE:   CC: Vero Michaels is an 42 year old woman who presents for preventive health visit.     Physical   Annual:     Getting at least 3 servings of Calcium per day:  Yes    Bi-annual eye exam:  Yes    Dental care twice a year:  Yes    Sleep apnea or symptoms of sleep apnea:  None    Diet:  Carbohydrate counting    Frequency of exercise:  6-7 days/week    Duration of exercise:  15-30 minutes    Taking medications regularly:  Yes    Medication side effects:  None    Additional concerns today:  No    Answers for HPI/ROS submitted by the patient on 7/26/2018   PHQ-2 Score: 0  If you checked off any problems, how difficult have these problems made it for you to do your work, take care of things at home, or get along with other people?: Very difficult  PHQ9 TOTAL SCORE: 6  ELIAS 7 TOTAL SCORE: 5        Today's PHQ-2 Score:   PHQ-2 ( 1999 [employer]) 7/26/2018   Q1: Little interest or pleasure in doing things 0   Q2: Feeling down, depressed or hopeless 0   PHQ-2 Score 0   Q1: Little interest or pleasure in doing things Not at all   Q2: Feeling down, depressed or hopeless Not at all   PHQ-2 Score 0       Abuse: Current or Past(Physical, Sexual or Emotional)- No  Do you feel safe in your environment - Yes    Social History   Substance Use Topics     Smoking status: Former Smoker     Packs/day: 1.00     Types: Cigarettes     Quit date: 8/20/2011     Smokeless tobacco: Never Used     Alcohol use No     Reviewed orders with patient.  Reviewed health maintenance and updated orders accordingly - Yes  Labs reviewed in EPIC  BP Readings from Last 3 Encounters:   07/26/18 112/72   03/30/18 116/67   01/05/18 110/74    Wt Readings from Last 3 Encounters:   07/26/18 189 lb (85.7 kg)   01/05/18 192 lb (87.1 kg)   12/08/17 191 lb (86.6 kg)                  Patient Active Problem List   Diagnosis     Depression     Former smoker     Hypertriglyceridemia     Non morbid obesity     Type 2 diabetes mellitus without complication, without  long-term current use of insulin (H)     Past Surgical History:   Procedure Laterality Date     CHOLECYSTECTOMY       GYN SURGERY      c-sec       Social History   Substance Use Topics     Smoking status: Former Smoker     Packs/day: 1.00     Types: Cigarettes     Quit date: 8/20/2011     Smokeless tobacco: Never Used     Alcohol use No     Family History   Problem Relation Age of Onset     Diabetes Brother      Diabetes Maternal Grandfather      Diabetes Maternal Uncle      Diabetes Paternal Grandfather      C.A.D. Paternal Grandfather      Diabetes Father      C.A.D. Father          Current Outpatient Prescriptions   Medication Sig Dispense Refill     aspirin 81 MG tablet Take 1 tablet (81 mg) by mouth daily 90 tablet 3     atorvastatin (LIPITOR) 40 MG tablet Take 1 tablet (40 mg) by mouth daily 90 tablet 3     blood glucose monitoring (ACCU-CHEK FASTCLIX) lancets Use to test blood sugar 2 times daily or as directed.  Ok to substitute alternative if insurance prefers. 100 each 2     blood glucose monitoring (ACCU-CHEK SMARTVIEW) test strip Use to test blood sugar 2 times daily or as directed.  Ok to substitute alternative if insurance prefers. 100 strip 3     Fenofibrate Micronized 134 MG CAPS Take 1 capsule (134 mg) by mouth daily 90 capsule 3     multivitamin, therapeutic with minerals (MULTI-VITAMIN) TABS Take 1 tablet by mouth daily       ACE/ARB NOT PRESCRIBED, INTENTIONAL, Please choose reason not prescribed, below (Patient not taking: Reported on 3/30/2018)       No Known Allergies  Recent Labs   Lab Test  09/22/17   1118  01/05/17   0812  12/01/16   0756  12/10/15   0741 11/21/11   A1C  5.9   --   6.1*   --    --    LDL   --   45   --   28  28   HDL   --   30*   --   25*  25   TRIG   --   103   --   263*  424   ALT   --   23   --   24  21   CR   --   0.83   --   0.80  0.78   GFRESTIMATED   --   75   --   78   --    GFRESTBLACK   --   >90   GFR Calc     --   >90   GFR Calc     " --    POTASSIUM   --   4.1   --   4.1  4.8   TSH   --   1.48   --   1.12  1.24        Patient under age 50, mutual decision reflected in health maintenance.      Pertinent mammograms are reviewed under the imaging tab.  History of abnormal Pap smear: NO - age 30- 65 PAP every 3 years recommended  PAP / HPV Latest Ref Rng & Units 2/2/2017   PAP - NIL   HPV 16 DNA NEG Negative   HPV 18 DNA NEG Negative   OTHER HR HPV NEG Negative     Reviewed and updated as needed this visit by clinical staff  Tobacco  Allergies  Meds  Problems  Med Hx  Surg Hx  Fam Hx  Soc Hx          Reviewed and updated as needed this visit by Provider  Allergies  Meds  Problems          Past Medical History:   Diagnosis Date     Gestational diabetes mellitus, class A2 7/15/2008     Hypertriglyceridemia 12/13/2015     Type 2 diabetes mellitus without complication, without long-term current use of insulin (H) 12/9/2016      Past Surgical History:   Procedure Laterality Date     CHOLECYSTECTOMY       GYN SURGERY      c-sec     Obstetric History     No data available          Review of Systems   Constitutional: Negative for chills and fever.   HENT: Negative for congestion and ear pain.    Eyes: Negative for pain.   Respiratory: Negative for cough.    Cardiovascular: Negative for chest pain.   Gastrointestinal: Negative for abdominal pain, constipation, diarrhea and hematochezia.   Endocrine: Negative.    Breasts:  negative.    Genitourinary: Negative for frequency, genital sores and hematuria.   Musculoskeletal: Negative.    Skin: Negative.    Allergic/Immunologic: Negative.    Neurological: Negative for dizziness.   Hematological: Negative.    Psychiatric/Behavioral: The patient is not nervous/anxious.           OBJECTIVE:   /72 (BP Location: Left arm, Patient Position: Chair, Cuff Size: Adult Regular)  Pulse 74  Temp 97.6  F (36.4  C) (Temporal)  Resp 16  Ht 5' 4.6\" (1.641 m)  Wt 189 lb (85.7 kg)  SpO2 100%  BMI 31.84 " kg/m2  Physical Exam   Constitutional: She is oriented to person, place, and time. She appears well-developed and well-nourished. No distress.   HENT:   Right Ear: Tympanic membrane and external ear normal.   Left Ear: Tympanic membrane and external ear normal.   Nose: Nose normal.   Mouth/Throat: Oropharynx is clear and moist. No oropharyngeal exudate.   Eyes: Conjunctivae are normal. Pupils are equal, round, and reactive to light. Right eye exhibits no discharge. Left eye exhibits no discharge.   Neck: Neck supple. No tracheal deviation present. No thyromegaly present.   Cardiovascular: Normal rate, regular rhythm, S1 normal, S2 normal, normal heart sounds and normal pulses.  Exam reveals no S3, no S4 and no friction rub.    No murmur heard.  Pulmonary/Chest: Effort normal and breath sounds normal. No respiratory distress. She has no wheezes. She has no rales.   Abdominal: Soft. Bowel sounds are normal. She exhibits no mass. There is no hepatosplenomegaly. There is no tenderness.   Musculoskeletal: Normal range of motion. She exhibits no edema.   Lymphadenopathy:     She has no cervical adenopathy.   Neurological: She is alert and oriented to person, place, and time. She has normal strength and normal reflexes. She exhibits normal muscle tone.   Skin: Skin is warm and dry. No rash noted.   Psychiatric: She has a normal mood and affect. Judgment and thought content normal. Cognition and memory are normal.       Diagnostic Test Results:  none     ASSESSMENT/PLAN:     Problem List Items Addressed This Visit     Depression     Well controlled. Does not wish to be on meds         Hypertriglyceridemia     Recheck lipid panel. Will send refills based on results         Type 2 diabetes mellitus without complication, without long-term current use of insulin (H)     diet controlled  -Home fasting Blood sugars- not checking  -Last eye exam-Will schedule. Sees [facility] eye clinic- 11/17  -Diabetic foot exam -negative  -Last  "urine microalbumin- check today  -Last LDL-continue statin  -Continue current medications- Diet controlled. -Discussed diet , lifestyle modifications, exercise and weight loss  -RTC in 6  months for follow up             Relevant Medications    aspirin 81 MG tablet    Other Relevant Orders    Hemoglobin A1c    Lipid Profile (Chol, Trig, HDL, LDL calc)    Comprehensive metabolic panel (BMP + Alb, Alk Phos, ALT, AST, Total. Bili, TP)    Albumin Random Urine Quantitative with Creat Ratio    Hemoglobin A1c      Other Visit Diagnoses     Encounter for routine adult health examination without abnormal findings    -  Primary    Screening for HIV (human immunodeficiency virus)        Screening for diabetic peripheral neuropathy        Relevant Orders    FOOT EXAM  NO CHARGE [79564.114] (Completed)    Need for prophylactic vaccination with tetanus-diphtheria (TD)                COUNSELING:  Reviewed preventive health counseling, as reflected in patient instructions       Regular exercise       Healthy diet/nutrition       Vision screening       Hearing screening    BP Readings from Last 1 Encounters:   07/26/18 112/72     Estimated body mass index is 31.84 kg/(m^2) as calculated from the following:    Height as of this encounter: 5' 4.6\" (1.641 m).    Weight as of this encounter: 189 lb (85.7 kg).           reports that she quit smoking about 6 years ago. Her smoking use included Cigarettes. She smoked 1.00 pack per day. She has never used smokeless tobacco.      Counseling Resources:  ATP IV Guidelines  Pooled Cohorts Equation Calculator  Breast Cancer Risk Calculator  FRAX Risk Assessment  ICSI Preventive Guidelines  Dietary Guidelines for Americans, 2010  USDA's MyPlate  ASA Prophylaxis  Lung CA Screening    Helen Ng MD  [facility]  "

## 2018-07-26 ENCOUNTER — OFFICE VISIT (OUTPATIENT)
Dept: FAMILY MEDICINE | Facility: OTHER | Age: 43
End: 2018-07-26
Payer: COMMERCIAL

## 2018-07-26 VITALS
OXYGEN SATURATION: 100 % | SYSTOLIC BLOOD PRESSURE: 112 MMHG | RESPIRATION RATE: 16 BRPM | HEIGHT: 65 IN | HEART RATE: 74 BPM | DIASTOLIC BLOOD PRESSURE: 72 MMHG | TEMPERATURE: 97.6 F | BODY MASS INDEX: 31.49 KG/M2 | WEIGHT: 189 LBS

## 2018-07-26 DIAGNOSIS — Z11.4 SCREENING FOR HIV (HUMAN IMMUNODEFICIENCY VIRUS): ICD-10-CM

## 2018-07-26 DIAGNOSIS — E11.9 TYPE 2 DIABETES MELLITUS WITHOUT COMPLICATION, WITHOUT LONG-TERM CURRENT USE OF INSULIN (H): ICD-10-CM

## 2018-07-26 DIAGNOSIS — Z00.00 ENCOUNTER FOR ROUTINE ADULT HEALTH EXAMINATION WITHOUT ABNORMAL FINDINGS: Primary | ICD-10-CM

## 2018-07-26 DIAGNOSIS — Z23 NEED FOR PROPHYLACTIC VACCINATION WITH TETANUS-DIPHTHERIA (TD): ICD-10-CM

## 2018-07-26 DIAGNOSIS — E78.1 HYPERTRIGLYCERIDEMIA: ICD-10-CM

## 2018-07-26 DIAGNOSIS — F32.A DEPRESSION, UNSPECIFIED DEPRESSION TYPE: ICD-10-CM

## 2018-07-26 DIAGNOSIS — Z13.89 SCREENING FOR DIABETIC PERIPHERAL NEUROPATHY: ICD-10-CM

## 2018-07-26 LAB
ALBUMIN SERPL-MCNC: 4.2 G/DL (ref 3.4–5)
ALP SERPL-CCNC: 32 U/L (ref 40–150)
ALT SERPL W P-5'-P-CCNC: 22 U/L (ref 0–50)
ANION GAP SERPL CALCULATED.3IONS-SCNC: 9 MMOL/L (ref 3–14)
AST SERPL W P-5'-P-CCNC: 12 U/L (ref 0–45)
BILIRUB SERPL-MCNC: 0.5 MG/DL (ref 0.2–1.3)
BUN SERPL-MCNC: 14 MG/DL (ref 7–30)
CALCIUM SERPL-MCNC: 9.2 MG/DL (ref 8.5–10.1)
CHLORIDE SERPL-SCNC: 107 MMOL/L (ref 94–109)
CHOLEST SERPL-MCNC: 95 MG/DL
CO2 SERPL-SCNC: 25 MMOL/L (ref 20–32)
CREAT SERPL-MCNC: 0.91 MG/DL (ref 0.52–1.04)
CREAT UR-MCNC: 238 MG/DL
GFR SERPL CREATININE-BSD FRML MDRD: 67 ML/MIN/1.7M2
GLUCOSE SERPL-MCNC: 118 MG/DL (ref 70–99)
HBA1C MFR BLD: 5.8 % (ref 0–5.6)
HDLC SERPL-MCNC: 28 MG/DL
LDLC SERPL CALC-MCNC: 52 MG/DL
MICROALBUMIN UR-MCNC: 15 MG/L
MICROALBUMIN/CREAT UR: 6.39 MG/G CR (ref 0–25)
NONHDLC SERPL-MCNC: 67 MG/DL
POTASSIUM SERPL-SCNC: 4.1 MMOL/L (ref 3.4–5.3)
PROT SERPL-MCNC: 7.3 G/DL (ref 6.8–8.8)
SODIUM SERPL-SCNC: 141 MMOL/L (ref 133–144)
TRIGL SERPL-MCNC: 73 MG/DL

## 2018-07-26 PROCEDURE — 36415 COLL VENOUS BLD VENIPUNCTURE: CPT | Performed by: FAMILY MEDICINE

## 2018-07-26 PROCEDURE — 80061 LIPID PANEL: CPT | Performed by: FAMILY MEDICINE

## 2018-07-26 PROCEDURE — 99207 C FOOT EXAM  NO CHARGE: CPT | Mod: 25 | Performed by: FAMILY MEDICINE

## 2018-07-26 PROCEDURE — 83036 HEMOGLOBIN GLYCOSYLATED A1C: CPT | Performed by: FAMILY MEDICINE

## 2018-07-26 PROCEDURE — 80053 COMPREHEN METABOLIC PANEL: CPT | Performed by: FAMILY MEDICINE

## 2018-07-26 PROCEDURE — 82043 UR ALBUMIN QUANTITATIVE: CPT | Performed by: FAMILY MEDICINE

## 2018-07-26 PROCEDURE — 99396 PREV VISIT EST AGE 40-64: CPT | Performed by: FAMILY MEDICINE

## 2018-07-26 ASSESSMENT — ENCOUNTER SYMPTOMS
ENDOCRINE NEGATIVE: 1
HEMATOCHEZIA: 0
CHILLS: 0
MUSCULOSKELETAL NEGATIVE: 1
HEMATURIA: 0
ABDOMINAL PAIN: 0
HEMATOLOGIC/LYMPHATIC NEGATIVE: 1
COUGH: 0
EYE PAIN: 0
ALLERGIC/IMMUNOLOGIC NEGATIVE: 1
DIARRHEA: 0
FEVER: 0
FREQUENCY: 0
NERVOUS/ANXIOUS: 0
DIZZINESS: 0
CONSTIPATION: 0

## 2018-07-26 ASSESSMENT — ANXIETY QUESTIONNAIRES
3. WORRYING TOO MUCH ABOUT DIFFERENT THINGS: SEVERAL DAYS
7. FEELING AFRAID AS IF SOMETHING AWFUL MIGHT HAPPEN: NOT AT ALL
4. TROUBLE RELAXING: SEVERAL DAYS
2. NOT BEING ABLE TO STOP OR CONTROL WORRYING: SEVERAL DAYS
GAD7 TOTAL SCORE: 5
5. BEING SO RESTLESS THAT IT IS HARD TO SIT STILL: SEVERAL DAYS
6. BECOMING EASILY ANNOYED OR IRRITABLE: SEVERAL DAYS
7. FEELING AFRAID AS IF SOMETHING AWFUL MIGHT HAPPEN: NOT AT ALL
1. FEELING NERVOUS, ANXIOUS, OR ON EDGE: NOT AT ALL
GAD7 TOTAL SCORE: 5
GAD7 TOTAL SCORE: 5

## 2018-07-26 ASSESSMENT — PATIENT HEALTH QUESTIONNAIRE - PHQ9
SUM OF ALL RESPONSES TO PHQ QUESTIONS 1-9: 6
10. IF YOU CHECKED OFF ANY PROBLEMS, HOW DIFFICULT HAVE THESE PROBLEMS MADE IT FOR YOU TO DO YOUR WORK, TAKE CARE OF THINGS AT HOME, OR GET ALONG WITH OTHER PEOPLE: VERY DIFFICULT
SUM OF ALL RESPONSES TO PHQ QUESTIONS 1-9: 6

## 2018-07-26 ASSESSMENT — PAIN SCALES - GENERAL: PAINLEVEL: NO PAIN (0)

## 2018-07-26 NOTE — ASSESSMENT & PLAN NOTE
diet controlled  -Home fasting Blood sugars- not checking  -Last eye exam-Will schedule. Sees [facility] eye clinic- 11/17  -Diabetic foot exam -negative  -Last urine microalbumin- check today  -Last LDL-continue statin  -Continue current medications- Diet controlled. -Discussed diet , lifestyle modifications, exercise and weight loss  -RTC in 6  months for follow up

## 2018-07-26 NOTE — PROGRESS NOTES
Ms. Page    Your recent test results are attached. Hemoglobin A1c is improved compared to 10 months ago at 5.8.  Cholesterol levels are stable.  Urine test is normal.    If you have any questions or concerns please contact me via My Chart or call the clinic at [phone]     Thank You  Helen Ng MD.

## 2018-07-26 NOTE — MR AVS SNAPSHOT
After Visit Summary   7/26/2018    Vero Michaels    MRN: [id number]           Patient Information     Date Of Birth          1975        Visit Information        Provider Department      7/26/2018 8:00 AM Helen Ng MD [facility]        Today's Diagnoses     Type 2 diabetes mellitus without complication, without long-term current use of insulin (H)    -  1    Screening for HIV (human immunodeficiency virus)        Screening for diabetic peripheral neuropathy        Need for prophylactic vaccination with tetanus-diphtheria (TD)          Care Instructions      Preventive Health Recommendations  Female Ages 40 to 49    Yearly exam:     See your health care provider every year in order to  1. Review health changes.   2. Discuss preventive care.    3. Review your medicines if your doctor prescribed any.      Get a Pap test every three years (unless you have an abnormal result and your provider advises testing more often).      If you get Pap tests with HPV test, you only need to test every 5 years, unless you have an abnormal result. You do not need a Pap test if your uterus was removed (hysterectomy) and you have not had cancer.      You should be tested each year for STDs (sexually transmitted diseases), if you're at risk.     Ask your doctor if you should have a mammogram.      Have a colonoscopy (test for colon cancer) if someone in your family has had colon cancer or polyps before age 50.       Have a cholesterol test every 5 years.       Have a diabetes test (fasting glucose) after age 45. If you are at risk for diabetes, you should have this test every 3 years.    Shots: Get a flu shot each year. Get a tetanus shot every 10 years.     Nutrition:     Eat at least 5 servings of fruits and vegetables each day.    Eat whole-grain bread, whole-wheat pasta and brown rice instead of white grains and rice.    Get adequate Calcium and Vitamin D.      Lifestyle    Exercise at least 150  minutes a week (an average of 30 minutes a day, 5 days a week). This will help you control your weight and prevent disease.    Limit alcohol to one drink per day.    No smoking.     Wear sunscreen to prevent skin cancer.    See your dentist every six months for an exam and cleaning.          Follow-ups after your visit        Follow-up notes from your care team     Return in about 1 year (around 7/26/2019) for Physical Exam.      Future tests that were ordered for you today     Open Future Orders        Priority Expected Expires Ordered    Hemoglobin A1c Routine 1/26/2019 7/26/2019 7/26/2018            Who to contact     If you have questions or need follow up information about today's clinic visit or your schedule please contact [facility] directly at [phone].  Normal or non-critical lab and imaging results will be communicated to you by MyChart, letter or phone within 4 business days after the clinic has received the results. If you do not hear from us within 7 days, please contact the clinic through MyChart or phone. If you have a critical or abnormal lab result, we will notify you by phone as soon as possible.  Submit refill requests through [employer] or call your pharmacy and they will forward the refill request to us. Please allow 3 business days for your refill to be completed.          Additional Information About Your Visit        [employer] Information     [employer] gives you secure access to your electronic health record. If you see a primary care provider, you can also send messages to your care team and make appointments. If you have questions, please call your primary care clinic.  If you do not have a primary care provider, please call [phone] and they will assist you.        Care EveryWhere ID     This is your Care EveryWhere ID. This could be used by other organizations to access your [place] medical records  [id number]        Your Vitals Were     Pulse Temperature Respirations  "Height Pulse Oximetry BMI (Body Mass Index)    74 97.6  F (36.4  C) (Temporal) 16 5' 4.6\" (1.641 m) 100% 31.84 kg/m2       Blood Pressure from Last 3 Encounters:   07/26/18 112/72   03/30/18 116/67   01/05/18 110/74    Weight from Last 3 Encounters:   07/26/18 189 lb (85.7 kg)   01/05/18 192 lb (87.1 kg)   12/08/17 191 lb (86.6 kg)              We Performed the Following     Albumin Random Urine Quantitative with Creat Ratio     Comprehensive metabolic panel (BMP + Alb, Alk Phos, ALT, AST, Total. Bili, TP)     FOOT EXAM  NO CHARGE [88902.114]     Hemoglobin A1c     Lipid Profile (Chol, Trig, HDL, LDL calc)          Today's Medication Changes          These changes are accurate as of 7/26/18  8:39 AM.  If you have any questions, ask your nurse or doctor.               Start taking these medicines.        Dose/Directions    aspirin 81 MG tablet   Used for:  Type 2 diabetes mellitus without complication, without long-term current use of insulin (H)   Started by:  Helen Ng MD        Dose:  81 mg   Take 1 tablet (81 mg) by mouth daily   Quantity:  90 tablet   Refills:  3         Stop taking these medicines if you haven't already. Please contact your care team if you have questions.     phentermine 37.5 MG tablet   Commonly known as:  ADIPEX-P   Stopped by:  Helen Ng MD                Where to get your medicines      These medications were sent to [facility] [address] [place] [address] [address] [address]     Phone:  [phone]     aspirin 81 MG tablet                Primary Care Provider Office Phone # Fax #    Helen Ng [phone]       [address] [place] [address]  [facility] [id number]        Equal Access to Services     ASTON PRESLEY AH: Daniel Villaseñor, alexys aranda, autumn vaughn, chandrakant medina. [facility] [phone].    ATENCIÓN: Si habla español, tiene a galvez disposición servicios gratuitos " de asistencia lingüística. Brianna goel [phone].    We comply with applicable federal civil rights laws and [place] laws. We do not discriminate on the basis of race, color, national origin, age, disability, sex, sexual orientation, or gender identity.            Thank you!     Thank you for choosing [facility]  for your care. Our goal is always to provide you with excellent care. Hearing back from our patients is one way we can continue to improve our services. Please take a few minutes to complete the written survey that you may receive in the mail after your visit with us. Thank you!             Your Updated Medication List - Protect others around you: Learn how to safely use, store and throw away your medicines at www.disposemymeds.org.          This list is accurate as of 7/26/18  8:39 AM.  Always use your most recent med list.                   Brand Name Dispense Instructions for use Diagnosis    ACE/ARB/ARNI NOT PRESCRIBED (INTENTIONAL)      Please choose reason not prescribed, below    Type 2 diabetes mellitus without complication, without long-term current use of insulin (H)       aspirin 81 MG tablet     90 tablet    Take 1 tablet (81 mg) by mouth daily    Type 2 diabetes mellitus without complication, without long-term current use of insulin (H)       atorvastatin 40 MG tablet    LIPITOR    90 tablet    Take 1 tablet (40 mg) by mouth daily    Hypertriglyceridemia, Type 2 diabetes mellitus without complication, without long-term current use of insulin (H)       blood glucose monitoring lancets     100 each    Use to test blood sugar 2 times daily or as directed.  Ok to substitute alternative if insurance prefers.    Type 2 diabetes mellitus without complication, without long-term current use of insulin (H)       blood glucose monitoring test strip    ACCU-CHEK SMARTVIEW    100 strip    Use to test blood sugar 2 times daily or as directed.  Ok to substitute alternative if insurance  prefers.    Type 2 diabetes mellitus without complication, without long-term current use of insulin (H)       Fenofibrate Micronized 134 MG Caps     90 capsule    Take 1 capsule (134 mg) by mouth daily    Hypertriglyceridemia       Multi-vitamin Tabs tablet      Take 1 tablet by mouth daily

## 2018-07-27 ASSESSMENT — PATIENT HEALTH QUESTIONNAIRE - PHQ9: SUM OF ALL RESPONSES TO PHQ QUESTIONS 1-9: 6

## 2018-07-27 ASSESSMENT — ANXIETY QUESTIONNAIRES: GAD7 TOTAL SCORE: 5

## 2018-09-21 DIAGNOSIS — E78.1 HYPERTRIGLYCERIDEMIA: ICD-10-CM

## 2018-09-21 RX ORDER — FENOFIBRATE 134 MG/1
CAPSULE ORAL
Qty: 90 CAPSULE | Refills: 3 | Status: SHIPPED | OUTPATIENT
Start: 2018-09-21 | End: 2019-09-15

## 2018-09-21 NOTE — TELEPHONE ENCOUNTER
"Requested Prescriptions   Pending Prescriptions Disp Refills     Fenofibrate Micronized 134 MG CAPS [Pharmacy Med Name: FENOFIBRATE 134MG   CAP] 90 capsule 3     Sig: TAKE ONE CAPSULE BY MOUTH ONCE DAILY    Fibrates Passed    9/21/2018  9:18 AM       Passed - Lipid panel on file in past 12 months    Recent Labs   Lab Test  07/26/18   0806   CHOL  95   TRIG  73   HDL  28*   LDL  52   NHDL  67            Passed - No abnormal creatine kinase in past 12 months    No lab results found.            Passed - Recent (12 mo) or future (30 days) visit within the authorizing provider's specialty    Patient had office visit in the last 12 months or has a visit in the next 30 days with authorizing provider or within the authorizing provider's specialty.  See \"Patient Info\" tab in inbasket, or \"Choose Columns\" in Meds & Orders section of the refill encounter.           Passed - Patient is age 18 or older       Passed - No active pregnancy on record       Passed - No positive pregnancy test in past 12 months          Last OV:  07/26/2018    Prescription approved per [facility] Refill Protocol.    Ced Curry, RANULFO, BSN    "

## 2018-10-28 DIAGNOSIS — E78.1 HYPERTRIGLYCERIDEMIA: ICD-10-CM

## 2018-10-28 DIAGNOSIS — E11.9 TYPE 2 DIABETES MELLITUS WITHOUT COMPLICATION, WITHOUT LONG-TERM CURRENT USE OF INSULIN (H): ICD-10-CM

## 2018-10-29 RX ORDER — ATORVASTATIN CALCIUM 40 MG/1
TABLET, FILM COATED ORAL
Qty: 90 TABLET | Refills: 2 | Status: SHIPPED | OUTPATIENT
Start: 2018-10-29 | End: 2019-08-19

## 2018-10-29 NOTE — TELEPHONE ENCOUNTER
Lipitor:  Prescription approved per [facility] Refill Protocol.    Jeanette Arevalo, RN, BSN

## 2019-02-01 ENCOUNTER — OFFICE VISIT (OUTPATIENT)
Dept: URGENT CARE | Facility: RETAIL CLINIC | Age: 44
End: 2019-02-01
Payer: COMMERCIAL

## 2019-02-01 VITALS — SYSTOLIC BLOOD PRESSURE: 123 MMHG | HEART RATE: 65 BPM | TEMPERATURE: 98.4 F | DIASTOLIC BLOOD PRESSURE: 81 MMHG

## 2019-02-01 DIAGNOSIS — B97.89 ACUTE VIRAL SINUSITIS: Primary | ICD-10-CM

## 2019-02-01 DIAGNOSIS — J01.90 ACUTE SINUSITIS WITH SYMPTOMS > 10 DAYS: ICD-10-CM

## 2019-02-01 DIAGNOSIS — J01.90 ACUTE VIRAL SINUSITIS: Primary | ICD-10-CM

## 2019-02-01 PROCEDURE — 99213 OFFICE O/P EST LOW 20 MIN: CPT | Performed by: PHYSICIAN ASSISTANT

## 2019-02-01 RX ORDER — NITROFURANTOIN 25; 75 MG/1; MG/1
CAPSULE ORAL
COMMUNITY
Start: 2018-12-09 | End: 2019-02-01

## 2019-02-01 NOTE — PROGRESS NOTES
Chief Complaint   Patient presents with     Sinus Problem     6 days; Left side under eye     Eye Problem     Left; watery     Nasal Congestion     stuffy, runny     Ent Problem     ears are popping     SUBJECTIVE:  Vero Michaels is a 43 year old female here with concerns about a sinus infection.  She states onset of symptoms was 6 days ago.    Course of illness is worsening.   Severity moderate  She has had left sided maxillary pressure as well as nasal congestion, ear fullness/pressure and her left eye is watery.  Predisposing factors include none.   Recent treatment has included: OTC meds    Past Medical History:   Diagnosis Date     Gestational diabetes mellitus, class A2 7/15/2008     Hypertriglyceridemia 2015     Type 2 diabetes mellitus without complication, without long-term current use of insulin (H) 2016     Current Outpatient Medications   Medication Sig Dispense Refill     [START ON 2019] amoxicillin-clavulanate (AUGMENTIN) 875-125 MG tablet Take 1 tablet by mouth 2 times daily for 7 days 14 tablet 0     atorvastatin (LIPITOR) 40 MG tablet TAKE ONE TABLET BY MOUTH ONCE DAILY 90 tablet 2     Fenofibrate Micronized 134 MG CAPS TAKE ONE CAPSULE BY MOUTH ONCE DAILY 90 capsule 3     multivitamin, therapeutic with minerals (MULTI-VITAMIN) TABS Take 1 tablet by mouth daily       ACE/ARB NOT PRESCRIBED, INTENTIONAL, Please choose reason not prescribed, below (Patient not taking: Reported on 3/30/2018)       aspirin 81 MG tablet Take 1 tablet (81 mg) by mouth daily (Patient not taking: Reported on 2019) 90 tablet 3     Social History     Tobacco Use     Smoking status: Former Smoker     Packs/day: 1.00     Types: Cigarettes     Last attempt to quit: 2011     Years since quittin.4     Smokeless tobacco: Never Used   Substance Use Topics     Alcohol use: No     No Known Allergies  REVIEW OF SYSTEMS  General: POSITIVE for headache. NEGATIVE for fever, chills.  Eyes: NEGATIVE for redness,  tearing, itching, discharge.  ENT: POSITIVE for nasal congestion, facial pain/pressure, ear fullness. NEGATIVE for sore throat.  Resp: NEGATIVE for cough.    OBJECTIVE:  /81 (BP Location: Left arm)   Pulse 65   Temp 98.4  F (36.9  C) (Oral)   GENERAL APPEARANCE: healthy, alert and no distress  EYES: PERRL, conjunctiva clear  HENT: Pain with palpation over frontal and maxillary sinuses. Ear canals normal TMs with mild serous effusions bilaterally. Nasal turbinates edematous and boggy with a blue hue bilaterally. Posterior pharynx is not erythematous.  NECK: supple, nontender, no lymphadenopathy  RESP: lungs clear to auscultation - no rales, rhonchi or wheezes  CV: regular rates and rhythm, normal S1 S2, no murmur noted    ASSESSMENT:    ICD-10-CM    1. Acute viral sinusitis J01.90     B97.89    2. Acute sinusitis with symptoms > 10 days J01.90 amoxicillin-clavulanate (AUGMENTIN) 875-125 MG tablet     PLAN:   Patient Instructions   Your symptoms appear to be viral at this time.  Secondary bacterial infections only happen in about 0.5-2% of cases.  Antibiotics are recommended only if you do not have any relief from your symptoms in 10 days or symptoms worsen after 7 days.  Nasal congestion often starts clear then turns yellow or green towards the end- this is not a sign of a bacterial infection.  Augmentin (amoxicillin-clavulanate) 875mg twice daily for 7 days as directed.    Flonase 2 sprays in each nostril daily until symptoms resolve, then continue 1 spray in each nostril for at least 5 more days.  Take Tylenol or an NSAID such as ibuprofen or naproxen as needed for pain.  May use netti pot with bottled or distilled water and saline packets to flush sinuses.  Sudafed (pseudoephedrine) behind the pharmacist counter for 3-5 days helps relieve congestion.  Saline drops or nasal sprays may loosen mucus.  Sit in the bathroom with the door closed and hot shower running to loosen mucus.    Contact primary care  clinic if you do not have any relief from your symptoms after 10 days.  Present to emergency room for significantly increasing pain, persistent high fever >102F, swelling/redness around your eyes, changes in your vision or ability to move your eyes, altered mental status or a severe headache.    Follow up with primary care provider with any problems, questions or concerns or if symptoms worsen or fail to improve. Patient agreed to plan and verbalized understanding.    Lou Frazier PA-C  [facility]

## 2019-02-01 NOTE — PATIENT INSTRUCTIONS
Your symptoms appear to be viral at this time.  Secondary bacterial infections only happen in about 0.5-2% of cases.  Antibiotics are recommended only if you do not have any relief from your symptoms in 10 days or symptoms worsen after 7 days.  Nasal congestion often starts clear then turns yellow or green towards the end- this is not a sign of a bacterial infection.  Augmentin (amoxicillin-clavulanate) 875mg twice daily for 7 days as directed.    Flonase 2 sprays in each nostril daily until symptoms resolve, then continue 1 spray in each nostril for at least 5 more days.  Take Tylenol or an NSAID such as ibuprofen or naproxen as needed for pain.  May use netti pot with bottled or distilled water and saline packets to flush sinuses.  Sudafed (pseudoephedrine) behind the pharmacist counter for 3-5 days helps relieve congestion.  Saline drops or nasal sprays may loosen mucus.  Sit in the bathroom with the door closed and hot shower running to loosen mucus.    Contact primary care clinic if you do not have any relief from your symptoms after 10 days.  Present to emergency room for significantly increasing pain, persistent high fever >102F, swelling/redness around your eyes, changes in your vision or ability to move your eyes, altered mental status or a severe headache.

## 2019-08-19 ENCOUNTER — TELEPHONE (OUTPATIENT)
Dept: FAMILY MEDICINE | Facility: OTHER | Age: 44
End: 2019-08-19

## 2019-08-19 DIAGNOSIS — E11.9 TYPE 2 DIABETES MELLITUS WITHOUT COMPLICATION, WITHOUT LONG-TERM CURRENT USE OF INSULIN (H): ICD-10-CM

## 2019-08-19 DIAGNOSIS — E78.1 HYPERTRIGLYCERIDEMIA: ICD-10-CM

## 2019-08-19 RX ORDER — ATORVASTATIN CALCIUM 40 MG/1
TABLET, FILM COATED ORAL
Qty: 30 TABLET | Refills: 0 | Status: SHIPPED | OUTPATIENT
Start: 2019-08-19 | End: 2019-09-15

## 2019-08-19 NOTE — TELEPHONE ENCOUNTER
Pending Prescriptions:                       Disp   Refills    atorvastatin (LIPITOR) 40 MG tablet [Phar*90 tab*2            Sig: TAKE 1 TABLET BY MOUTH ONCE DAILY    Medication is being filled for 1 time refill only due to:  Patient needs labs LDL. needs to be seen, due for annual OV.  Last OV 7/26/2018      Ora Sweet, RN, BSN

## 2019-08-19 NOTE — TELEPHONE ENCOUNTER
Left message for patient to call back. Please see message below and help schedule appointment.  Sandra Vo, CMA

## 2019-08-21 DIAGNOSIS — E11.9 TYPE 2 DIABETES MELLITUS WITHOUT COMPLICATION, WITHOUT LONG-TERM CURRENT USE OF INSULIN (H): ICD-10-CM

## 2019-08-21 DIAGNOSIS — E78.1 HYPERTRIGLYCERIDEMIA: ICD-10-CM

## 2019-08-21 NOTE — TELEPHONE ENCOUNTER
Pending Prescriptions:                       Disp   Refills    atorvastatin (LIPITOR) 40 MG tablet [Phar*90 tab*2            Sig: TAKE 1 TABLET BY MOUTH ONCE DAILY    Routing refill request to provider for review/approval because:  Betty given x1 and patient did not follow up, please advise    Aline Scherer, RN, BSN

## 2019-08-22 RX ORDER — ATORVASTATIN CALCIUM 40 MG/1
TABLET, FILM COATED ORAL
Qty: 90 TABLET | Refills: 2 | OUTPATIENT
Start: 2019-08-22

## 2019-08-23 NOTE — PROGRESS NOTES
Subjective     Vero Michaels is a 44 year old female who presents to clinic today for the following health issues:    History of Present Illness        Hyperlipidemia:  She presents for follow up of hyperlipidemia.  She is taking (Atrovastatin) medication to lower cholesterol. She is not having myalgia or other side effects to statin medications.She is not reporting shortness of breath, increased sweating or nausea with activity, left-sided neck or arm pain, chest pain or pressure, or pain in calves when walking 1-2 blocks.    She eats 4 or more servings of fruits and vegetables daily.She consumes 0 sweetened beverage(s) daily.  She is taking medications regularly.     Answers for HPI/ROS submitted by the patient on 2019   Chronic problems general questions HPI Form  If you checked off any problems, how difficult have these problems made it for you to do your work, take care of things at home, or get along with other people?: Not difficult at all  PHQ9 TOTAL SCORE: 0  ELIAS 7 TOTAL SCORE: 0    -------------------------------------    Patient Active Problem List   Diagnosis     Depression     Former smoker     Hypertriglyceridemia     Non morbid obesity     Type 2 diabetes mellitus without complication, without long-term current use of insulin (H)     Past Surgical History:   Procedure Laterality Date     CHOLECYSTECTOMY       GYN SURGERY      c-sec       Social History     Tobacco Use     Smoking status: Former Smoker     Packs/day: 1.00     Types: Cigarettes     Last attempt to quit: 2011     Years since quittin.0     Smokeless tobacco: Never Used   Substance Use Topics     Alcohol use: No     Family History   Problem Relation Age of Onset     Diabetes Brother      Diabetes Maternal Grandfather      Diabetes Maternal Uncle      Diabetes Paternal Grandfather      C.A.D. Paternal Grandfather      Diabetes Father      C.A.D. Father          Current Outpatient Medications   Medication Sig Dispense Refill      "atorvastatin (LIPITOR) 40 MG tablet TAKE 1 TABLET BY MOUTH ONCE DAILY 30 tablet 0     Fenofibrate Micronized 134 MG CAPS TAKE ONE CAPSULE BY MOUTH ONCE DAILY 90 capsule 3     phentermine (ADIPEX-P) 37.5 MG tablet Take 1 tablet (37.5 mg) by mouth every morning (before breakfast) 30 tablet 0     ACE/ARB NOT PRESCRIBED, INTENTIONAL, Please choose reason not prescribed, below (Patient not taking: Reported on 3/30/2018)       aspirin 81 MG tablet Take 1 tablet (81 mg) by mouth daily (Patient not taking: Reported on 2/1/2019) 90 tablet 3     multivitamin, therapeutic with minerals (MULTI-VITAMIN) TABS Take 1 tablet by mouth daily       No Known Allergies  BP Readings from Last 3 Encounters:   08/30/19 124/68   02/01/19 123/81   07/26/18 112/72    Wt Readings from Last 3 Encounters:   08/30/19 90 kg (198 lb 8 oz)   07/26/18 85.7 kg (189 lb)   01/05/18 87.1 kg (192 lb)                    Reviewed and updated as needed this visit by Provider         Review of Systems   ROS COMP: Constitutional, HEENT, cardiovascular, pulmonary, GI, , musculoskeletal, neuro, skin, endocrine and psych systems are negative, except as otherwise noted.      Objective    /68 (BP Location: Right arm, Patient Position: Chair, Cuff Size: Adult Regular)   Pulse 66   Temp 98  F (36.7  C) (Temporal)   Resp 16   Ht 1.641 m (5' 4.6\")   Wt 90 kg (198 lb 8 oz)   SpO2 100%   BMI 33.44 kg/m    Body mass index is 33.44 kg/m .  Physical Exam   Constitutional: She appears well-developed and well-nourished.   HENT:   Head: Normocephalic and atraumatic.   Eyes: EOM are normal.   Neck: Neck supple.   Cardiovascular: Normal rate, regular rhythm, normal heart sounds and intact distal pulses. Exam reveals no gallop and no friction rub.   No murmur heard.  Pulmonary/Chest: Effort normal and breath sounds normal.   Abdominal: Soft. Bowel sounds are normal.   Psychiatric: She has a normal mood and affect. Her behavior is normal. Judgment and thought " "content normal.          Diagnostic Test Results:  Labs reviewed in Epic        Assessment & Plan   Problem List Items Addressed This Visit     Depression     In remission         Hypertriglyceridemia     Continue atorvastatin         Non morbid obesity     She gained 9 pounds since the last visit. Wishes to try phentermine . She has had successful weight loss with well controlled diabetes with phentermine in the past. Will try again for 3 months.         Relevant Medications    phentermine (ADIPEX-P) 37.5 MG tablet    Type 2 diabetes mellitus without complication, without long-term current use of insulin (H) - Primary     diet controlled  -Home fasting Blood sugars- not checking  -Last eye exam-Will schedule. Sees [facility] eye clinic- needs repeat  -Diabetic foot exam -negative  -Last urine microalbumin- check today  -Last LDL-continue statin  -Continue current medications- Diet controlled. -Discussed diet , lifestyle modifications, exercise and weight loss  -RTC in 6  months for follow up         Relevant Medications    phentermine (ADIPEX-P) 37.5 MG tablet    Other Relevant Orders    TSH WITH FREE T4 REFLEX (Completed)    HEMOGLOBIN A1C (Completed)    COMPREHENSIVE METABOLIC PANEL (Completed)    Lipid panel reflex to direct LDL Fasting (Completed)    Albumin Random Urine Quantitative with Creat Ratio (Completed)      Other Visit Diagnoses     Class 1 obesity due to excess calories without serious comorbidity with body mass index (BMI) of 33.0 to 33.9 in adult        Relevant Medications    phentermine (ADIPEX-P) 37.5 MG tablet    Encounter for screening mammogram for breast cancer        Relevant Orders    *MA Screening Digital Bilateral             BMI:   Estimated body mass index is 33.44 kg/m  as calculated from the following:    Height as of this encounter: 1.641 m (5' 4.6\").    Weight as of this encounter: 90 kg (198 lb 8 oz).           See Patient Instructions  Return in about 6 months (around " 2/29/2020).    Helen Ng MD  [facility]    Answers for HPI/ROS submitted by the patient on 8/30/2019   Chronic problems general questions HPI Form  If you checked off any problems, how difficult have these problems made it for you to do your work, take care of things at home, or get along with other people?: Not difficult at all  PHQ9 TOTAL SCORE: 0  ELIAS 7 TOTAL SCORE: 0

## 2019-08-30 ENCOUNTER — OFFICE VISIT (OUTPATIENT)
Dept: FAMILY MEDICINE | Facility: OTHER | Age: 44
End: 2019-08-30
Payer: COMMERCIAL

## 2019-08-30 VITALS
OXYGEN SATURATION: 100 % | BODY MASS INDEX: 33.07 KG/M2 | HEIGHT: 65 IN | HEART RATE: 66 BPM | RESPIRATION RATE: 16 BRPM | TEMPERATURE: 98 F | SYSTOLIC BLOOD PRESSURE: 124 MMHG | DIASTOLIC BLOOD PRESSURE: 68 MMHG | WEIGHT: 198.5 LBS

## 2019-08-30 DIAGNOSIS — F32.A DEPRESSION, UNSPECIFIED DEPRESSION TYPE: ICD-10-CM

## 2019-08-30 DIAGNOSIS — E78.1 HYPERTRIGLYCERIDEMIA: ICD-10-CM

## 2019-08-30 DIAGNOSIS — Z12.31 ENCOUNTER FOR SCREENING MAMMOGRAM FOR BREAST CANCER: ICD-10-CM

## 2019-08-30 DIAGNOSIS — E66.811 CLASS 1 OBESITY DUE TO EXCESS CALORIES WITHOUT SERIOUS COMORBIDITY WITH BODY MASS INDEX (BMI) OF 33.0 TO 33.9 IN ADULT: ICD-10-CM

## 2019-08-30 DIAGNOSIS — E11.9 TYPE 2 DIABETES MELLITUS WITHOUT COMPLICATION, WITHOUT LONG-TERM CURRENT USE OF INSULIN (H): Primary | ICD-10-CM

## 2019-08-30 DIAGNOSIS — E66.9 NON MORBID OBESITY: ICD-10-CM

## 2019-08-30 DIAGNOSIS — E66.09 CLASS 1 OBESITY DUE TO EXCESS CALORIES WITHOUT SERIOUS COMORBIDITY WITH BODY MASS INDEX (BMI) OF 33.0 TO 33.9 IN ADULT: ICD-10-CM

## 2019-08-30 LAB
ALBUMIN SERPL-MCNC: 3.9 G/DL (ref 3.4–5)
ALP SERPL-CCNC: 39 U/L (ref 40–150)
ALT SERPL W P-5'-P-CCNC: 28 U/L (ref 0–50)
ANION GAP SERPL CALCULATED.3IONS-SCNC: 6 MMOL/L (ref 3–14)
AST SERPL W P-5'-P-CCNC: 12 U/L (ref 0–45)
BILIRUB SERPL-MCNC: 0.6 MG/DL (ref 0.2–1.3)
BUN SERPL-MCNC: 7 MG/DL (ref 7–30)
CALCIUM SERPL-MCNC: 9.2 MG/DL (ref 8.5–10.1)
CHLORIDE SERPL-SCNC: 110 MMOL/L (ref 94–109)
CHOLEST SERPL-MCNC: 97 MG/DL
CO2 SERPL-SCNC: 27 MMOL/L (ref 20–32)
CREAT SERPL-MCNC: 0.72 MG/DL (ref 0.52–1.04)
CREAT UR-MCNC: 49 MG/DL
GFR SERPL CREATININE-BSD FRML MDRD: >90 ML/MIN/{1.73_M2}
GLUCOSE SERPL-MCNC: 111 MG/DL (ref 70–99)
HBA1C MFR BLD: 5.5 % (ref 0–5.6)
HDLC SERPL-MCNC: 36 MG/DL
LDLC SERPL CALC-MCNC: 45 MG/DL
MICROALBUMIN UR-MCNC: <5 MG/L
MICROALBUMIN/CREAT UR: NORMAL MG/G CR (ref 0–25)
NONHDLC SERPL-MCNC: 61 MG/DL
POTASSIUM SERPL-SCNC: 3.9 MMOL/L (ref 3.4–5.3)
PROT SERPL-MCNC: 7.1 G/DL (ref 6.8–8.8)
SODIUM SERPL-SCNC: 143 MMOL/L (ref 133–144)
TRIGL SERPL-MCNC: 80 MG/DL
TSH SERPL DL<=0.005 MIU/L-ACNC: 1.65 MU/L (ref 0.4–4)

## 2019-08-30 PROCEDURE — 36415 COLL VENOUS BLD VENIPUNCTURE: CPT | Performed by: FAMILY MEDICINE

## 2019-08-30 PROCEDURE — 80053 COMPREHEN METABOLIC PANEL: CPT | Performed by: FAMILY MEDICINE

## 2019-08-30 PROCEDURE — 84443 ASSAY THYROID STIM HORMONE: CPT | Performed by: FAMILY MEDICINE

## 2019-08-30 PROCEDURE — 80061 LIPID PANEL: CPT | Performed by: FAMILY MEDICINE

## 2019-08-30 PROCEDURE — 99214 OFFICE O/P EST MOD 30 MIN: CPT | Performed by: FAMILY MEDICINE

## 2019-08-30 PROCEDURE — 82043 UR ALBUMIN QUANTITATIVE: CPT | Performed by: FAMILY MEDICINE

## 2019-08-30 PROCEDURE — 83036 HEMOGLOBIN GLYCOSYLATED A1C: CPT | Performed by: FAMILY MEDICINE

## 2019-08-30 RX ORDER — PHENTERMINE HYDROCHLORIDE 37.5 MG/1
37.5 TABLET ORAL
Qty: 30 TABLET | Refills: 0 | Status: SHIPPED | OUTPATIENT
Start: 2019-08-30 | End: 2019-10-01

## 2019-08-30 ASSESSMENT — ANXIETY QUESTIONNAIRES
5. BEING SO RESTLESS THAT IT IS HARD TO SIT STILL: NOT AT ALL
6. BECOMING EASILY ANNOYED OR IRRITABLE: NOT AT ALL
2. NOT BEING ABLE TO STOP OR CONTROL WORRYING: NOT AT ALL
3. WORRYING TOO MUCH ABOUT DIFFERENT THINGS: NOT AT ALL
1. FEELING NERVOUS, ANXIOUS, OR ON EDGE: NOT AT ALL
GAD7 TOTAL SCORE: 0
GAD7 TOTAL SCORE: 0
7. FEELING AFRAID AS IF SOMETHING AWFUL MIGHT HAPPEN: NOT AT ALL
4. TROUBLE RELAXING: NOT AT ALL
7. FEELING AFRAID AS IF SOMETHING AWFUL MIGHT HAPPEN: NOT AT ALL
GAD7 TOTAL SCORE: 0

## 2019-08-30 ASSESSMENT — MIFFLIN-ST. JEOR: SCORE: 1534.92

## 2019-08-30 ASSESSMENT — PAIN SCALES - GENERAL: PAINLEVEL: NO PAIN (0)

## 2019-08-30 ASSESSMENT — PATIENT HEALTH QUESTIONNAIRE - PHQ9
10. IF YOU CHECKED OFF ANY PROBLEMS, HOW DIFFICULT HAVE THESE PROBLEMS MADE IT FOR YOU TO DO YOUR WORK, TAKE CARE OF THINGS AT HOME, OR GET ALONG WITH OTHER PEOPLE: NOT DIFFICULT AT ALL
SUM OF ALL RESPONSES TO PHQ QUESTIONS 1-9: 0
SUM OF ALL RESPONSES TO PHQ QUESTIONS 1-9: 0

## 2019-08-30 NOTE — ASSESSMENT & PLAN NOTE
She gained 9 pounds since the last visit. Wishes to try phentermine . She has had successful weight loss with well controlled diabetes with phentermine in the past. Will try again for 3 months.

## 2019-08-30 NOTE — ASSESSMENT & PLAN NOTE
diet controlled  -Home fasting Blood sugars- not checking  -Last eye exam-Will schedule. Sees [facility] eye clinic- needs repeat  -Diabetic foot exam -negative  -Last urine microalbumin- check today  -Last LDL-continue statin  -Continue current medications- Diet controlled. -Discussed diet , lifestyle modifications, exercise and weight loss  -RTC in 6  months for follow up

## 2019-08-31 ASSESSMENT — PATIENT HEALTH QUESTIONNAIRE - PHQ9: SUM OF ALL RESPONSES TO PHQ QUESTIONS 1-9: 0

## 2019-08-31 ASSESSMENT — ANXIETY QUESTIONNAIRES: GAD7 TOTAL SCORE: 0

## 2019-09-01 ENCOUNTER — TRANSFERRED RECORDS (OUTPATIENT)
Dept: MULTI SPECIALTY CLINIC | Facility: CLINIC | Age: 44
End: 2019-09-01

## 2019-09-01 LAB — RETINOPATHY: NORMAL

## 2019-09-15 DIAGNOSIS — E78.1 HYPERTRIGLYCERIDEMIA: ICD-10-CM

## 2019-09-15 DIAGNOSIS — E11.9 TYPE 2 DIABETES MELLITUS WITHOUT COMPLICATION, WITHOUT LONG-TERM CURRENT USE OF INSULIN (H): ICD-10-CM

## 2019-09-17 RX ORDER — FENOFIBRATE 134 MG/1
CAPSULE ORAL
Qty: 90 CAPSULE | Refills: 3 | Status: SHIPPED | OUTPATIENT
Start: 2019-09-17 | End: 2020-09-28

## 2019-09-17 RX ORDER — ATORVASTATIN CALCIUM 40 MG/1
TABLET, FILM COATED ORAL
Qty: 90 TABLET | Refills: 3 | Status: SHIPPED | OUTPATIENT
Start: 2019-09-17 | End: 2020-09-28

## 2019-09-17 NOTE — TELEPHONE ENCOUNTER
Lipitor & Fenofibrate  Prescription approved per [facility] Refill Protocol.    Jeanette Arevalo, RN, BSN

## 2019-10-01 DIAGNOSIS — E66.811 CLASS 1 OBESITY DUE TO EXCESS CALORIES WITHOUT SERIOUS COMORBIDITY WITH BODY MASS INDEX (BMI) OF 33.0 TO 33.9 IN ADULT: ICD-10-CM

## 2019-10-01 DIAGNOSIS — E11.9 TYPE 2 DIABETES MELLITUS WITHOUT COMPLICATION, WITHOUT LONG-TERM CURRENT USE OF INSULIN (H): ICD-10-CM

## 2019-10-01 DIAGNOSIS — E66.09 CLASS 1 OBESITY DUE TO EXCESS CALORIES WITHOUT SERIOUS COMORBIDITY WITH BODY MASS INDEX (BMI) OF 33.0 TO 33.9 IN ADULT: ICD-10-CM

## 2019-10-01 RX ORDER — PHENTERMINE HYDROCHLORIDE 37.5 MG/1
37.5 TABLET ORAL
Qty: 30 TABLET | Refills: 0 | Status: SHIPPED | OUTPATIENT
Start: 2019-10-01 | End: 2019-10-30

## 2019-10-01 NOTE — TELEPHONE ENCOUNTER
Pending Prescriptions:                       Disp   Refills    phentermine (ADIPEX-P) 37.5 MG tablet     30 tab*0            Sig: Take 1 tablet (37.5 mg) by mouth every morning           (before breakfast)    Last Written Prescription Date:  8/30/2019  Last Fill Quantity: 30,  # refills: 0   Last office visit: 8/30/2019 with prescribing provider:  EMANI   Future Office Visit:      Aline Scherer, RN, BSN

## 2019-10-01 NOTE — TELEPHONE ENCOUNTER
Reason for Call:  Medication or medication refill:    Do you use a [facility]?  Name of the pharmacy and phone number for the current request:  [facility] - [phone]    Name of the medication requested:  phentermine (ADIPEX-P) 37.5 MG tablet     Other request: Patient requesting refill to [facility].    Can we leave a detailed message on this number? YES    Phone number patient can be reached at: Home number on file [phone] (home)    Best Time: any    Call taken on 10/1/2019 at 10:12 AM by Go Shaw

## 2019-10-30 DIAGNOSIS — E66.811 CLASS 1 OBESITY DUE TO EXCESS CALORIES WITHOUT SERIOUS COMORBIDITY WITH BODY MASS INDEX (BMI) OF 33.0 TO 33.9 IN ADULT: ICD-10-CM

## 2019-10-30 DIAGNOSIS — E66.09 CLASS 1 OBESITY DUE TO EXCESS CALORIES WITHOUT SERIOUS COMORBIDITY WITH BODY MASS INDEX (BMI) OF 33.0 TO 33.9 IN ADULT: ICD-10-CM

## 2019-10-30 DIAGNOSIS — E11.9 TYPE 2 DIABETES MELLITUS WITHOUT COMPLICATION, WITHOUT LONG-TERM CURRENT USE OF INSULIN (H): ICD-10-CM

## 2019-10-30 RX ORDER — PHENTERMINE HYDROCHLORIDE 37.5 MG/1
37.5 TABLET ORAL
Qty: 30 TABLET | Refills: 0 | Status: SHIPPED | OUTPATIENT
Start: 2019-10-30

## 2019-10-30 NOTE — TELEPHONE ENCOUNTER
"Phentermine       Last Written Prescription Date:  10/01/2019  Last Fill Quantity: 30,   # refills: 0  Last Office Visit: 08/30/2019  Future Office visit:       Routing refill request to provider for review/approval because:  Drug not on the [facility], [facility] or [facility] refill protocol or controlled substance    Per last OV: \"  Non morbid obesity        She gained 9 pounds since the last visit. Wishes to try phentermine . She has had successful weight loss with well controlled diabetes with phentermine in the past. Will try again for 3 months.        \"  "

## 2019-10-30 NOTE — TELEPHONE ENCOUNTER
Reason for Call:  Medication or medication refill:    Do you use a [facility]?  Name of the pharmacy and phone number for the current request:  [facility] - [phone]    Name of the medication requested: phentermine    Other request: patient requesting refill. Declined calling pharmacy    Can we leave a detailed message on this number? YES    Phone number patient can be reached at: Home number on file [phone] (home)    Best Time: any    Call taken on 10/30/2019 at 9:57 AM by Lynnette Sosa

## 2019-11-08 ENCOUNTER — HEALTH MAINTENANCE LETTER (OUTPATIENT)
Age: 44
End: 2019-11-08

## 2020-01-07 ENCOUNTER — TELEPHONE (OUTPATIENT)
Dept: FAMILY MEDICINE | Facility: OTHER | Age: 45
End: 2020-01-07

## 2020-01-07 NOTE — TELEPHONE ENCOUNTER
Summary:    Patient is due/failing the following:   Eye Exam-however if previous completed please ask when and where to update her records.    Action needed:   Schedule an eye exam    Type of outreach:    Phone, left message for patient to call back.     Questions for provider review:    None                                                                                                                                    Teri Robles [facility]     Chart routed to Care Team .

## 2020-01-30 NOTE — TELEPHONE ENCOUNTER
Next 5 appointments (look out 90 days)    Feb 21, 2020  8:00 AM CST  [facility] [address]  [facility] [address]  [phone]        Teri Robles CMA

## 2020-02-12 ENCOUNTER — ANCILLARY PROCEDURE (OUTPATIENT)
Dept: MAMMOGRAPHY | Facility: OTHER | Age: 45
End: 2020-02-12
Payer: COMMERCIAL

## 2020-02-12 DIAGNOSIS — Z12.31 ENCOUNTER FOR SCREENING MAMMOGRAM FOR BREAST CANCER: ICD-10-CM

## 2020-02-12 PROCEDURE — 77063 BREAST TOMOSYNTHESIS BI: CPT | Mod: TC

## 2020-02-12 PROCEDURE — 77067 SCR MAMMO BI INCL CAD: CPT | Mod: TC

## 2020-03-02 ENCOUNTER — TELEPHONE (OUTPATIENT)
Dept: FAMILY MEDICINE | Facility: OTHER | Age: 45
End: 2020-03-02

## 2020-03-02 NOTE — TELEPHONE ENCOUNTER
Please abstract the following data from this visit with this patient into the appropriate field in Epic:    Tests that can be patient reported without a hard copy:    Eye exam with ophthalmology on this date: 09/2019 [facility] per patient.     Other Tests found in the patient's chart through Chart Review/Care Everywhere:        Note to Abstraction: If this section is blank, no results were found via Chart Review/Care Everywhere.      Panel Management Review      Patient has the following on her problem list:     Diabetes    ASA: Passed    Last A1C  Lab Results   Component Value Date    A1C 5.5 08/30/2019    A1C 5.8 07/26/2018    A1C 5.9 09/22/2017    A1C 6.1 12/01/2016    A1C 5.5 07/08/2008     A1C tested: Passed    Last LDL:    Lab Results   Component Value Date    CHOL 97 08/30/2019     Lab Results   Component Value Date    HDL 36 08/30/2019     Lab Results   Component Value Date    LDL 45 08/30/2019     Lab Results   Component Value Date    TRIG 80 08/30/2019     No results found for: CHOLHDLRATIO  Lab Results   Component Value Date    NHDL 61 08/30/2019       Is the patient on a Statin? YES             Is the patient on Aspirin? YES    Medications     HMG CoA Reductase Inhibitors     atorvastatin (LIPITOR) 40 MG tablet       Salicylates     aspirin 81 MG tablet             Last three blood pressure readings:  BP Readings from Last 3 Encounters:   08/30/19 124/68   02/01/19 123/81   07/26/18 112/72            Tobacco History:     History   Smoking Status     Former Smoker     Packs/day: 1.00     Types: Cigarettes     Quit date: 8/20/2011   Smokeless Tobacco     Never Used           Composite cancer screening  Chart review shows that this patient is due/due soon for the following Pap Smear  Summary:    Patient is due/failing the following:   A1C,diabetic follow up,  PAP and PHYSICAL    Action needed:   Patient needs office visit for Physical, PAP and diabetic follow up.    Type of outreach:    Phone, spoke to  patient.  patient will call back to schedule.    Questions for provider review:    None                                                                                                                                    Teri Robles [facility]       Chart routed to Care Team .

## 2020-09-28 DIAGNOSIS — E11.9 TYPE 2 DIABETES MELLITUS WITHOUT COMPLICATION, WITHOUT LONG-TERM CURRENT USE OF INSULIN (H): ICD-10-CM

## 2020-09-28 DIAGNOSIS — E78.1 HYPERTRIGLYCERIDEMIA: ICD-10-CM

## 2020-09-28 NOTE — TELEPHONE ENCOUNTER
Reason for Call:  Medication or medication refill:    Do you use a [facility]?  Name of the pharmacy and phone number for the current request: [facility] [phone]    Name of the medication requested: atorvastatin (LIPITOR) 40 MG tablet, Fenofibrate 134 MG CAPS     Other request: Pt is requesting a refill on this medication. Currently she moved out of [facility] and per pt she can not get in its about a months wait. She has a week left. Please Advise thank you    Can we leave a detailed message on this number? YES    Phone number patient can be reached at: Home number on file [phone] (home)    Best Time: anytime    Call taken on 9/28/2020 at 8:50 AM by Manuela Barrios

## 2020-09-29 RX ORDER — FENOFIBRATE 134 MG/1
134 CAPSULE ORAL DAILY
Qty: 90 CAPSULE | Refills: 0 | Status: SHIPPED | OUTPATIENT
Start: 2020-09-29

## 2020-09-29 RX ORDER — ATORVASTATIN CALCIUM 40 MG/1
40 TABLET, FILM COATED ORAL DAILY
Qty: 90 TABLET | Refills: 0 | Status: SHIPPED | OUTPATIENT
Start: 2020-09-29

## 2020-09-29 NOTE — TELEPHONE ENCOUNTER
Will flag for provider to review.  Per [place].  Would you give her a refill to get her till her new provider?  Hamilton Ag, JOVANNYN, RN, PHN

## 2020-12-06 ENCOUNTER — HEALTH MAINTENANCE LETTER (OUTPATIENT)
Age: 45
End: 2020-12-06

## 2021-06-05 ENCOUNTER — HEALTH MAINTENANCE LETTER (OUTPATIENT)
Age: 46
End: 2021-06-05

## 2021-07-31 ENCOUNTER — HEALTH MAINTENANCE LETTER (OUTPATIENT)
Age: 46
End: 2021-07-31

## 2021-09-25 ENCOUNTER — HEALTH MAINTENANCE LETTER (OUTPATIENT)
Age: 46
End: 2021-09-25

## 2022-01-15 ENCOUNTER — HEALTH MAINTENANCE LETTER (OUTPATIENT)
Age: 47
End: 2022-01-15

## 2022-03-12 ENCOUNTER — HEALTH MAINTENANCE LETTER (OUTPATIENT)
Age: 47
End: 2022-03-12

## 2022-07-02 ENCOUNTER — HEALTH MAINTENANCE LETTER (OUTPATIENT)
Age: 47
End: 2022-07-02

## 2023-04-22 ENCOUNTER — HEALTH MAINTENANCE LETTER (OUTPATIENT)
Age: 48
End: 2023-04-22

## 2023-06-13 NOTE — MR AVS SNAPSHOT
After Visit Summary   3/15/2017    Vero Michaels    MRN: [id number]           Patient Information     Date Of Birth          1975        Visit Information        Provider Department      3/15/2017 11:30 AM ER DIABETIC ED RESOURCE [facility]        Care Instructions    1.  Schedule eye appointment  2.  Continue weight loss efforts with eating and activity  3.  Continue to test blood sugar 2 times a day.    FOLLOW UP:  May 17th @ 9:00 am    [place] [facility] for the [facility]:  For Your Diabetes Education and Nutrition Appointments Call:  [phone]   For Diabetes Education or Nutrition Related Questions:   Phone: [phone]  E-mail: [email or web address]  Fax: [phone]   If you need a medication refill please contact your pharmacy. Please allow 3 business days for your refills to be completed.    Instructions for emailing the Diabetes Educators    If you need to communicate a non-urgent message to a Diabetes Educator via email, please send to [email or web address].    Please follow the following email guidelines:    Subject line: Secure: your clinic name (example: Secure: [facility])  In the email please include: First name, middle initial, last name and date of birth.    We will be in touch with you within one (1) business day.            Follow-ups after your visit        Your next 10 appointments already scheduled     May 17, 2017  9:00 AM CDT   Diabetic Education with ER DIABETIC ED RESOURCE   [facility] ([facility])    [address] [place] [id number]   [phone]              Who to contact     If you have questions or need follow up information about today's clinic visit or your schedule please contact [facility] directly at [phone].  Normal or non-critical lab and imaging results will be communicated to you by MyChart, letter or phone within 4 business days after  "the clinic has received the results. If you do not hear from us within 7 days, please contact the clinic through [facility] or phone. If you have a critical or abnormal lab result, we will notify you by phone as soon as possible.  Submit refill requests through [facility] or call your pharmacy and they will forward the refill request to us. Please allow 3 business days for your refill to be completed.          Additional Information About Your Visit        [employer] Information     [facility] lets you send messages to your doctor, view your test results, renew your prescriptions, schedule appointments and more. To sign up, go to [email or web address] . Click on \"Log in\" on the left side of the screen, which will take you to the Welcome page. Then click on \"Sign up Now\" on the right side of the page.     You will be asked to enter the access code listed below, as well as some personal information. Please follow the directions to create your username and password.     Your access code is: [email or web address]  Expires: 2017 12:24 PM     Your access code will  in 90 days. If you need help or a new code, please call your [facility] clinic or [phone].        Care EveryWhere ID     This is your Care EveryWhere ID. This could be used by other organizations to access your [facility] medical records  [id number]        Your Vitals Were     Height BMI (Body Mass Index)                1.645 m (5' 4.75\") 33.71 kg/m2           Blood Pressure from Last 3 Encounters:   17 126/80   17 110/74   16 116/76    Weight from Last 3 Encounters:   03/15/17 91.2 kg (201 lb)   17 94.6 kg (208 lb 8 oz)   17 95.3 kg (210 lb)              Today, you had the following     No orders found for display       Primary Care Provider Office Phone # Fax #    Helen Ng [phone]       [facility] [address] [facility] [address]    [facility] [id number]        Thank you!     Thank you for choosing " [facility]  for your care. Our goal is always to provide you with excellent care. Hearing back from our patients is one way we can continue to improve our services. Please take a few minutes to complete the written survey that you may receive in the mail after your visit with us. Thank you!             Your Updated Medication List - Protect others around you: Learn how to safely use, store and throw away your medicines at www.disposemymeds.org.          This list is accurate as of: 3/15/17 12:24 PM.  Always use your most recent med list.                   Brand Name Dispense Instructions for use    atorvastatin 40 MG tablet    LIPITOR    90 tablet    Take 1 tablet (40 mg) by mouth daily       blood glucose monitoring lancets     1 Box    Use to test blood sugar 2 times daily or as directed.  Ok to substitute alternative if insurance prefers.       blood glucose monitoring test strip    ACCU-CHEK SMARTVIEW    100 strip    Use to test blood sugar 2 times daily or as directed.  Ok to substitute alternative if insurance prefers.       cyanocobalamin 1000 MCG tablet    vitamin  B-12     Take  by mouth daily.       Fenofibrate Micronized 134 MG Caps     90 capsule    Take 1 capsule (134 mg) by mouth daily       Multi-vitamin Tabs tablet      Take 1 tablet by mouth daily       phentermine 37.5 MG capsule     30 capsule    Take 1 capsule (37.5 mg) by mouth every morning          [Calm] : calm [de-identified] : NAD, comfortable [de-identified] : NCAT, no scleral icterus  [de-identified] : +BS soft NT ND.  No hepatosplenomegaly.  Well-healed Pfannenstiel incision from prior  without evidence of any incisional hernias with Valsalva maneuver. [de-identified] : No clubbing, cyanosis, or edema. [de-identified] : Warm, dry. [de-identified] : A&Ox3

## 2023-07-15 ENCOUNTER — HEALTH MAINTENANCE LETTER (OUTPATIENT)
Age: 48
End: 2023-07-15

## 2023-12-02 ENCOUNTER — HEALTH MAINTENANCE LETTER (OUTPATIENT)
Age: 48
End: 2023-12-02

## 2025-06-16 NOTE — NURSING NOTE
"Chief Complaint   Patient presents with     Recheck Medication     Panel Management     mychart, tdap, eye exam, phq/carmita       Initial /76 (BP Location: Right arm, Patient Position: Chair, Cuff Size: Adult Large)  Pulse 78  Temp 98.3  F (36.8  C) (Oral)  Resp 14  Wt 200 lb (90.7 kg)  SpO2 100%  BMI 33.54 kg/m2 Estimated body mass index is 33.54 kg/(m^2) as calculated from the following:    Height as of 3/15/17: 5' 4.75\" (1.645 m).    Weight as of this encounter: 200 lb (90.7 kg).  Medication Reconciliation: complete   Radha Lucero CMA (AAMA)      " ----- Message from Hailee HOOPER MA sent at 6/11/2025 12:01 PM CDT -----    ----- Message -----  From: Macy Garcia MD  Sent: 6/10/2025   5:44 PM CDT  To: ANGELES [facility] Prac Nurse Msg Pool    Please notify pt of CT scan of the abdomen confirming a 2.7 cm adrenal lesion consistent with benign finding.  There is also a very small hiatal hernia and a tiny nonobstructing kidney stone.  No worrisome findings.  Please confirm PCP with patient.